# Patient Record
Sex: MALE | Race: NATIVE HAWAIIAN OR OTHER PACIFIC ISLANDER | NOT HISPANIC OR LATINO | Employment: OTHER | URBAN - METROPOLITAN AREA
[De-identification: names, ages, dates, MRNs, and addresses within clinical notes are randomized per-mention and may not be internally consistent; named-entity substitution may affect disease eponyms.]

---

## 2019-09-04 ENCOUNTER — TRANSFERRED RECORDS (OUTPATIENT)
Dept: HEALTH INFORMATION MANAGEMENT | Facility: CLINIC | Age: 69
End: 2019-09-04

## 2019-09-04 ENCOUNTER — MEDICAL CORRESPONDENCE (OUTPATIENT)
Dept: HEALTH INFORMATION MANAGEMENT | Facility: CLINIC | Age: 69
End: 2019-09-04

## 2019-09-12 NOTE — TELEPHONE ENCOUNTER
RECORDS RECEIVED FROM: External - Dr. Juan Bolton - [facility]   DATE RECEIVED: 10/29/19   NOTES (FOR ALL VISITS) STATUS DETAILS   OFFICE NOTE from referring provider In process 9/4/19   OFFICE NOTE from other specialist N/A    DISCHARGE SUMMARY from hospital N/A    DISCHARGE REPORT from the ER N/A    OPERATIVE REPORT N/A    MEDICATION LIST Received    IMAGING  (FOR ALL VISITS)     EMG N/A    EEG N/A    ECT N/A    MRI (HEAD, NECK, SPINE) N/A    LUMBAR PUNCTURE N/A    HE Scan N/A    CT (HEAD, NECK, SPINE) N/A       Action    Action Taken Only 9/4/19 office note received, will request additional notes via fax.

## 2019-09-26 ENCOUNTER — DOCUMENTATION ONLY (OUTPATIENT)
Dept: CARE COORDINATION | Facility: CLINIC | Age: 69
End: 2019-09-26

## 2019-10-29 ENCOUNTER — PRE VISIT (OUTPATIENT)
Dept: NEUROLOGY | Facility: CLINIC | Age: 69
End: 2019-10-29

## 2019-10-29 ENCOUNTER — OFFICE VISIT (OUTPATIENT)
Dept: NEUROLOGY | Facility: CLINIC | Age: 69
End: 2019-10-29
Payer: MEDICARE

## 2019-10-29 VITALS
WEIGHT: 185.5 LBS | SYSTOLIC BLOOD PRESSURE: 130 MMHG | OXYGEN SATURATION: 98 % | HEART RATE: 61 BPM | DIASTOLIC BLOOD PRESSURE: 60 MMHG

## 2019-10-29 DIAGNOSIS — G25.0 ESSENTIAL TREMOR: Primary | ICD-10-CM

## 2019-10-29 RX ORDER — AMPICILLIN TRIHYDRATE 250 MG
500 CAPSULE ORAL DAILY
COMMUNITY
Start: 2007-01-29 | End: 2020-05-14

## 2019-10-29 RX ORDER — CLOBETASOL PROPIONATE 0.05 MG/G
GEL TOPICAL
COMMUNITY
Start: 2019-06-14 | End: 2024-03-04

## 2019-10-29 RX ORDER — FLUOCINONIDE 0.5 MG/G
CREAM TOPICAL
COMMUNITY
Start: 2017-09-05 | End: 2020-05-14

## 2019-10-29 RX ORDER — NITROGLYCERIN 0.4 MG/1
0.4 TABLET SUBLINGUAL EVERY 5 MIN PRN
COMMUNITY
Start: 2018-04-10

## 2019-10-29 RX ORDER — CEPHALEXIN 500 MG/1
500 CAPSULE ORAL 2 TIMES DAILY
COMMUNITY
Start: 2019-09-20 | End: 2020-05-14

## 2019-10-29 RX ORDER — FUROSEMIDE 20 MG
20 TABLET ORAL DAILY PRN
COMMUNITY
Start: 2018-04-10

## 2019-10-29 RX ORDER — PRIMIDONE 50 MG/1
TABLET ORAL
Refills: 2 | COMMUNITY
Start: 2019-10-03 | End: 2021-02-09

## 2019-10-29 RX ORDER — ATORVASTATIN CALCIUM 80 MG/1
80 TABLET, FILM COATED ORAL DAILY
Refills: 0 | COMMUNITY
Start: 2019-08-07 | End: 2024-03-04

## 2019-10-29 RX ORDER — ASPIRIN 81 MG/1
81 TABLET ORAL DAILY
COMMUNITY
Start: 2008-04-03

## 2019-10-29 RX ORDER — SILDENAFIL CITRATE 20 MG/1
100 TABLET ORAL DAILY PRN
COMMUNITY
Start: 2019-05-07

## 2019-10-29 RX ORDER — DEXAMETHASONE 0.5 MG/5ML
1.5 SOLUTION ORAL 2 TIMES DAILY PRN
Refills: 6 | COMMUNITY
Start: 2019-10-15

## 2019-10-29 RX ORDER — NAPROXEN SODIUM 220 MG
1 TABLET ORAL 3 TIMES DAILY PRN
Status: ON HOLD | COMMUNITY
Start: 2007-06-11 | End: 2024-03-10

## 2019-10-29 RX ORDER — PROPRANOLOL HCL 60 MG
60 CAPSULE, EXTENDED RELEASE 24HR ORAL DAILY
COMMUNITY
Start: 2015-09-21 | End: 2020-05-14

## 2019-10-29 RX ORDER — TOPIRAMATE 25 MG/1
25 TABLET, FILM COATED ORAL 2 TIMES DAILY
Qty: 100 TABLET | Refills: 3 | Status: SHIPPED | OUTPATIENT
Start: 2019-10-29 | End: 2020-05-14

## 2019-10-29 RX ORDER — LORATADINE 10 MG/1
10 TABLET ORAL DAILY PRN
COMMUNITY
Start: 2016-08-12

## 2019-10-29 RX ORDER — LOSARTAN POTASSIUM 100 MG/1
100 TABLET ORAL DAILY
Refills: 3 | COMMUNITY
Start: 2019-08-18 | End: 2024-03-04

## 2019-10-29 RX ORDER — METHYLPREDNISOLONE 4 MG
1 TABLET, DOSE PACK ORAL DAILY
COMMUNITY
Start: 2004-04-05

## 2019-10-29 RX ORDER — INSULIN GLARGINE 100 [IU]/ML
30 INJECTION, SOLUTION SUBCUTANEOUS DAILY PRN
Refills: 0 | COMMUNITY
Start: 2019-10-14

## 2019-10-29 RX ORDER — FACIAL-BODY WIPES
1 EACH TOPICAL DAILY
COMMUNITY
Start: 2008-04-03

## 2019-10-29 SDOH — HEALTH STABILITY: MENTAL HEALTH: HOW OFTEN DO YOU HAVE A DRINK CONTAINING ALCOHOL?: MONTHLY OR LESS

## 2019-10-29 SDOH — HEALTH STABILITY: MENTAL HEALTH: HOW MANY STANDARD DRINKS CONTAINING ALCOHOL DO YOU HAVE ON A TYPICAL DAY?: 1 OR 2

## 2019-10-29 ASSESSMENT — PAIN SCALES - GENERAL: PAINLEVEL: NO PAIN (0)

## 2019-10-29 NOTE — LETTER
RE: Enrique Long  [address] [facility] [address]     Dear Colleague,    Thank you for referring your patient, Enrique Long, to the [facility] NEUROLOGY at [facility]. Please see a copy of my visit note below.    [facility]   DEPARTMENT OF NEUROLOGY  MOVEMENT DISORDERS CLINIC    Service Date: 10/29/2019        REFERRAL FOR:  We were asked to see Enrique Long by Dr. Bolton for evaluation of essential tremor versus focal dystonia.      HISTORY OF PRESENT ILLNESS:  Enrique Long is a very pleasant, 68-year-old gentleman with a past medical history significant for type 1 diabetes who presents in the Neurology office for evaluation of tremor.  The patient notes that he started having issues approximately 6-7 years ago primarily with writing using his right hand.  He notes that he was a  and started to have difficulty writing notes with his clients.  He said it was very mild at first, but he was able to write. Slowly, over time it became more and more illegible.  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  Unfortunately, this took away from the relationship between him and his clients, and he was forced into long term.  He notes that he was placed on primidone for this, which seemed to help somewhat, but has not taken away his difficulty with writing.  He has tried to quit taking primidone; however, his handwriting significantly worsens each time he had tried a taper.  He notes that over the last 2-3 years, he primarily has to use his left hand to hold down his right hand in order to even sign his name.  Off of the primidone, he is unable to use his left hand to hold down his right hand to sign his name.  Over the last year, he has started to have occasional trouble cutting meat with a knife and using a fork, balancing food and bringing it to his mouth.  He has tried an increased  dose of the primidone, but was unable to tolerate due to possible sexual dysfunction as well as drowsiness.  He is currently taking 1 tablet of primidone at night.  He does not notice any significant tremor in his left hand or the remainder of his body.  He has not noted any difficulty in walking or changes in his cognition.  He had tried propranolol in the past, but it caused a cough and therefore was stopped.  He denies any changes with alcohol or alcohol consumption.      REVIEW OF SYSTEMS:  Comprehensive review of systems was performed.      ALLERGIES:       Allergies   Allergen Reactions     Chocolate Flavor      Mouth pain     Codeine GI Disturbance and Other (See Comments)     PN: LW Reaction: gi upset       Lisinopril Cough     MEDICATIONS:    Current Outpatient Medications   Medication     aspirin 81 MG EC tablet     atorvastatin (LIPITOR) 80 MG tablet     cephALEXin (KEFLEX) 500 MG capsule     cinnamon 500 MG CAPS     clobetasol (TEMOVATE) 0.05 % GEL topical gel     dexamethasone AF (DECADRON) 0.1 MG/ML solution     Efinaconazole 10 % SOLN     fluocinonide (LIDEX) 0.05 % external cream     furosemide (LASIX) 20 MG tablet     glucagon (GLUCAGEN HYPOKIT) 1 MG SOLR injection     Glucosamine Sulfate 500 MG TABS     insulin aspart (NOVOLOG PEN) 100 UNIT/ML pen     insulin glargine (BASAGLAR KWIKPEN) 100 UNIT/ML pen     loratadine (CLARITIN) 10 MG tablet     losartan (COZAAR) 100 MG tablet     methylcellulose (CITRUCEL) 500 MG TABS tablet     naproxen sodium (ALEVE) 220 MG tablet     nitroGLYcerin (NITROSTAT) 0.4 MG sublingual tablet     Omega 3-6-9 Fatty Acids (RA OMEGA 3-6-9) CAPS     primidone (MYSOLINE) 50 MG tablet     propranolol ER (INDERAL LA) 60 MG 24 hr capsule     sildenafil (REVATIO) 20 MG tablet     topiramate (TOPAMAX) 25 MG tablet     UNABLE TO FIND     No current facility-administered medications for this visit.      PAST MEDICAL HISTORY:  Type 1 diabetes.      PAST SURGICAL HISTORY:  No  "pertinent surgical history.      SOCIAL HISTORY:  The patient is currently .  The patient was a  prior to skilled nursing.      FAMILY HISTORY:  The patient does have a significant family history of tremor.  He notes his \"dad's hands shook like crazy.\"  He  at age 66.  He believes his oldest sister also has a tremor, but it is not severe.      PHYSICAL EXAMINATION:   VITAL SIGNS:  Blood pressure 130/60, pulse 61.   GENERAL:  The patient is sitting in a chair in no acute distress.   HEENT:  Atraumatic.   CARDIAC:  Distal pulses are palpable.   PULMONARY:  Not in respiratory distress.   ABDOMEN:  Nondistended.   EXTREMITIES:  No abnormal swelling noted.   SKIN:  No abnormal bruising noted.   PSYCHIATRIC:  Appropriate affect.      NEUROLOGIC EXAMINATION:     MENTAL STATUS:  The patient is alert and oriented to person, place, time and situation.  His language is fluent with intact comprehension and naming.   CRANIAL NERVES:  Pupils are equal, round and reactive to light, extraocular motions are intact, full visual fields.  No facial asymmetry is noted.  Facial sensation is intact.  Tongue is midline.   MOTOR:  The patient has 5/5 strength throughout bilateral upper and lower extremities.   REFLEXES:  The patient has 2+ and symmetric reflex at the brachioradialis, biceps and patellae, 1+ at the Achilles.   SENSORY:  The patient has intact sensation to light touch and vibration throughout all 4 extremities.   COORDINATION:  The patient has an intention tremor noted primarily in the RUE. There is a subtle action tremor in the LUE.  The patient does have a significant tremor while copying figures as well as tremor while writing name.  This is worse on the right than the left, but present in both upper extremities.  The patient is unable to copy figures due to a severity of tremor. No noted dystonia during examination.  GAIT:  Normal width, stride length, turn and arm swing.          INVESTIGATIONS:  " We reviewed all relevant investigations during this office visit.      ASSESSMENT AND PLAN:  Mr. Lucio Long is a very pleasant, 68-year-old gentleman with a past medical history of type 1 diabetes mellitus who presents to the Neurology office for further evaluation of tremors versus dystonia.  On physical examination, the patient does have what appears to be an intention tremor in the RUE. There is a very subtle intention tremor in the LUE that is notable upon certain fine coordination tasks such as copying a figure.  The patient's most notable complaint is that he is having a difficult time writing.  It is very difficult to distinguish between a primary writing tremor and essential tremor in this patient. The bilateral nature indicates more likely to be essential tremor. However, it is very subtle in the LUE and very severe while writing which does make primary writing tremor a possibility. We extensively examined the patient for muscles that would be amenable to Botox injections. His motions and tremor are very exaggerated and therefore, we do not feel like Botox injections would be possible at this time. We would recommend further medication adjustment for essential tremor at this time. The patient was on propranolol in the past, but stopped this medication due to a cough.  The patient continues on primidone, but is unable to tolerate higher doses due to side effects.  Further options for treatment of this include Topamax and gabapentin.  We will attempt a trial of topiramate after the risks and benefits were discussed. He will start at 25mg BID and titrate up to benefit or until 100mg BID is reached.    #Essential tremor versus primary writing tremor  -Does not appear to be amenable to Botox injections  -Continue primidone  -Begin topamax 25mg BID and increase to adequate response or 100mg BID is reached.     The patient was seen and discussed with Dr. Won Gautam.      Carlos A Limon MD  Neurology  Resident, PGY-4    I, Won Gautam MD, personally interviewed, examined and evaluated this patient on 10/29/2019.  I discussed the patient with Dr. Carlos A Limon and agree with the assessment, examination and plan of care documented by Dr. Limon>.  I personally reviewed the vital signs, medications and labs/imaging.    This is a diagnostic dilemma.  We appreciate being asked to give a second opinion by Dr. Bolton.  This looks like primary writing tremor.  However, there is a family history of tremor, he has some tremor on the left side and he has had a response to Primidone.  All the latter points to assymetric essential tremor.  We will try topamax.  If this is not effective the only other thing to try would be borulinum toxin.  The tremor is not causing enough disability to consider DBS.    Dr. Won Gautam    D: 10/29/2019   T: 10/30/2019   MT: [email or web address]      Name:     WALTER RENTERIA   MRN:      [id number]        Account:      [id number]   :      1950           Service Date: 10/29/2019      Document: [id number]

## 2019-10-29 NOTE — PROGRESS NOTES
DEPARTMENT OF NEUROLOGY  Referral for: ***  Patient Name:  Enrique Long  MRN:  [id number]    :  1950  Date of Clinic Visit:  2019  Primary Care Provider:  No primary care provider on file.  Referring Provider: ***    CHIEF COMPLAINT: ***    HISTORY OF PRESENT ILLNESS:  Enrique Long is a 68 year old male presenting with ***.    Cant write his name, cant write anything.     Started 6-7 years ago, was having trouble controlling where his writing went. Mild at first and then became impossible. 2-3 years its been impossible to write. Primidone seemed to help somewhat. He has tried to quit primidone and it gets worse. Needs hand to hold it down with primidone. Off of it not even that helps. Currently taking primidone 1 tablet once at night.     Occasionally has trouble cutting meat with knife and fork. Holding a fork steady sometimes. Also t1dm so blood sugar plays a role in that. 1=2 a week other than writing. Doesn't recall these changing with primidone.     Retired eaerlier as  due to writing cramp in .     No tremor in the left hand.     Tried propranolol in the past but it caused a cough.     No change with alcohol.     ASSESSMENT AND PLAN:  ***    Plan:  - ***    Patient was seen and discussed with  ***.    Carlos A Limon MD  [facility] Department of Neurology  Pager: [phone]    PHYSICAL EXAMINATION:  Vitals: /60 (BP Location: Left arm, Patient Position: Sitting, Cuff Size: Adult Large)   Pulse 61   Wt 84.1 kg (185 lb 8 oz)   SpO2 98%   General:   HEENT:   Cardiac:   Pulmonary:   Abdomen:   Extremities:   Skin:   Psych:   Neuro:   Mental status: alert and oriented to person, place, and time; language is fluent with intact comprehension and naming   Cranial nerves: PERRL, EOMI, full visual fields, no facial asymmetry, facial sensation intact, tongue and uvula are midline   Motor: No abnormal posture, tone, atrophy, or  movements/fasciculations.    Biceps Triceps Deltoid Hip flexor Knee extension Knee flexion Ankle extension Ankle flexion   Right {Motor Rankin} {Motor Rankin} {Motor Rankin} {Motor Rankin} {Motor Rankin} {Motor Rankin} {Motor Rankin} {Motor Rankin} {Motor Rankin}   Left {Motor Rankin} {Motor Rankin} {Motor Rankin} {Motor Rankin} {Motor Rankin} {Motor Rankin} {Motor Rankin} {Motor Rankin} {Motor Rankin}    Reflexes: Absent Babinski. Absent Escobedo.   Brachioradialis Biceps Triceps Patellar Achilles   Right 2+ 2+ 2+ 2+ 2+   Left 2+ 2+ 2+ 2+ 2+    Sensory: Intact to light touch, pin prick, and vibration in all extremities. Romberg exam within normal limits.   Coordination: Intact FNF and heel-shin. No Dysmetria. No Dysdiadochokinesia.   Gait: Normal width, stride length, turn, and arm swing.    INVESTIGATIONS:   ***    REVIEW OF SYSTEMS: 12-point RoS negative except as per HPI.    ALLERGIES:  Allergies   Allergen Reactions     Chocolate Flavor      Mouth pain     Codeine GI Disturbance and Other (See Comments)     PN: LW Reaction: gi upset       Lisinopril Cough     MEDICATIONS:  Current Outpatient Medications   Medication Sig Dispense Refill     aspirin 81 MG EC tablet Take 81 mg by mouth daily       UNABLE TO FIND MEDICATION NAME: CBD: 1 tablet by mouth twice daily       atorvastatin (LIPITOR) 80 MG tablet Take 80 mg by mouth daily  0     PAST MEDICAL HISTORY:  No past medical history on file.  PAST SURGICAL HISTORY:  No past surgical history on file.  SOCIAL HISTORY:  Social History     Socioeconomic History     Marital status:      Spouse name: Not on file     Number of children: Not on file     Years of education: Not on file     Highest education level: Not on file   Occupational History     Not on file   Social Needs      Financial resource strain: Not on file     Food insecurity:     Worry: Not on file     Inability: Not on file     Transportation needs:     Medical: Not on file     Non-medical: Not on file   Tobacco Use     Smoking status: Not on file   Substance and Sexual Activity     Alcohol use: Not on file     Drug use: Not on file     Sexual activity: Not on file   Lifestyle     Physical activity:     Days per week: Not on file     Minutes per session: Not on file     Stress: Not on file   Relationships     Social connections:     Talks on phone: Not on file     Gets together: Not on file     Attends Faith service: Not on file     Active member of club or organization: Not on file     Attends meetings of clubs or organizations: Not on file     Relationship status: Not on file     Intimate partner violence:     Fear of current or ex partner: Not on file     Emotionally abused: Not on file     Physically abused: Not on file     Forced sexual activity: Not on file   Other Topics Concern     Not on file   Social History Narrative     Not on file     FAMILY HISTORY:  No family history on file.   Dads hands shook like crazy,  at 66.  7 siblings. Oldest sister is 88 and possibly has tremor.

## 2019-10-30 ENCOUNTER — HEALTH MAINTENANCE LETTER (OUTPATIENT)
Age: 69
End: 2019-10-30

## 2019-10-30 NOTE — PROGRESS NOTES
[facility]   DEPARTMENT OF NEUROLOGY  MOVEMENT DISORDERS CLINIC    Service Date: 10/29/2019        REFERRAL FOR:  We were asked to see Enrique Long by Dr. Bolton for evaluation of essential tremor versus focal dystonia.      HISTORY OF PRESENT ILLNESS:  Enrique Long is a very pleasant, 68-year-old gentleman with a past medical history significant for type 1 diabetes who presents in the Neurology office for evaluation of tremor.  The patient notes that he started having issues approximately 6-7 years ago primarily with writing using his right hand.  He notes that he was a  and started to have difficulty writing notes with his clients.  He said it was very mild at first, but he was able to write. Slowly, over time it became more and more illegible.  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  Unfortunately, this took away from the relationship between him and his clients, and he was forced into residential.  He notes that he was placed on primidone for this, which seemed to help somewhat, but has not taken away his difficulty with writing.  He has tried to quit taking primidone; however, his handwriting significantly worsens each time he had tried a taper.  He notes that over the last 2-3 years, he primarily has to use his left hand to hold down his right hand in order to even sign his name.  Off of the primidone, he is unable to use his left hand to hold down his right hand to sign his name.  Over the last year, he has started to have occasional trouble cutting meat with a knife and using a fork, balancing food and bringing it to his mouth.  He has tried an increased dose of the primidone, but was unable to tolerate due to possible sexual dysfunction as well as drowsiness.  He is currently taking 1 tablet of primidone at night.  He does not notice any significant tremor in his left hand or the remainder of his body.  He has not noted any  "difficulty in walking or changes in his cognition.  He had tried propranolol in the past, but it caused a cough and therefore was stopped.  He denies any changes with alcohol or alcohol consumption.      REVIEW OF SYSTEMS:  Comprehensive review of systems was performed.      ALLERGIES:       Allergies   Allergen Reactions     Chocolate Flavor      Mouth pain     Codeine GI Disturbance and Other (See Comments)     PN: LW Reaction: gi upset       Lisinopril Cough     MEDICATIONS:    Current Outpatient Medications   Medication     aspirin 81 MG EC tablet     atorvastatin (LIPITOR) 80 MG tablet     cephALEXin (KEFLEX) 500 MG capsule     cinnamon 500 MG CAPS     clobetasol (TEMOVATE) 0.05 % GEL topical gel     dexamethasone AF (DECADRON) 0.1 MG/ML solution     Efinaconazole 10 % SOLN     fluocinonide (LIDEX) 0.05 % external cream     furosemide (LASIX) 20 MG tablet     glucagon (GLUCAGEN HYPOKIT) 1 MG SOLR injection     Glucosamine Sulfate 500 MG TABS     insulin aspart (NOVOLOG PEN) 100 UNIT/ML pen     insulin glargine (BASAGLAR KWIKPEN) 100 UNIT/ML pen     loratadine (CLARITIN) 10 MG tablet     losartan (COZAAR) 100 MG tablet     methylcellulose (CITRUCEL) 500 MG TABS tablet     naproxen sodium (ALEVE) 220 MG tablet     nitroGLYcerin (NITROSTAT) 0.4 MG sublingual tablet     Omega 3-6-9 Fatty Acids (RA OMEGA 3-6-9) CAPS     primidone (MYSOLINE) 50 MG tablet     propranolol ER (INDERAL LA) 60 MG 24 hr capsule     sildenafil (REVATIO) 20 MG tablet     topiramate (TOPAMAX) 25 MG tablet     UNABLE TO FIND     No current facility-administered medications for this visit.      PAST MEDICAL HISTORY:  Type 1 diabetes.      PAST SURGICAL HISTORY:  No pertinent surgical history.      SOCIAL HISTORY:  The patient is currently .  The patient was a  prior to nursing home.      FAMILY HISTORY:  The patient does have a significant family history of tremor.  He notes his \"dad's hands shook like crazy.\"  He  at " age 66.  He believes his oldest sister also has a tremor, but it is not severe.      PHYSICAL EXAMINATION:   VITAL SIGNS:  Blood pressure 130/60, pulse 61.   GENERAL:  The patient is sitting in a chair in no acute distress.   HEENT:  Atraumatic.   CARDIAC:  Distal pulses are palpable.   PULMONARY:  Not in respiratory distress.   ABDOMEN:  Nondistended.   EXTREMITIES:  No abnormal swelling noted.   SKIN:  No abnormal bruising noted.   PSYCHIATRIC:  Appropriate affect.      NEUROLOGIC EXAMINATION:     MENTAL STATUS:  The patient is alert and oriented to person, place, time and situation.  His language is fluent with intact comprehension and naming.   CRANIAL NERVES:  Pupils are equal, round and reactive to light, extraocular motions are intact, full visual fields.  No facial asymmetry is noted.  Facial sensation is intact.  Tongue is midline.   MOTOR:  The patient has 5/5 strength throughout bilateral upper and lower extremities.   REFLEXES:  The patient has 2+ and symmetric reflex at the brachioradialis, biceps and patellae, 1+ at the Achilles.   SENSORY:  The patient has intact sensation to light touch and vibration throughout all 4 extremities.   COORDINATION:  The patient has an intention tremor noted primarily in the RUE. There is a subtle action tremor in the LUE.  The patient does have a significant tremor while copying figures as well as tremor while writing name.  This is worse on the right than the left, but present in both upper extremities.  The patient is unable to copy figures due to a severity of tremor. No noted dystonia during examination.  GAIT:  Normal width, stride length, turn and arm swing.          INVESTIGATIONS:  We reviewed all relevant investigations during this office visit.      ASSESSMENT AND PLAN:  Mr. Lucio Long is a very pleasant, 68-year-old gentleman with a past medical history of type 1 diabetes mellitus who presents to the Neurology office for further evaluation of tremors versus  dystonia.  On physical examination, the patient does have what appears to be an intention tremor in the RUE. There is a very subtle intention tremor in the LUE that is notable upon certain fine coordination tasks such as copying a figure.  The patient's most notable complaint is that he is having a difficult time writing.  It is very difficult to distinguish between a primary writing tremor and essential tremor in this patient. The bilateral nature indicates more likely to be essential tremor. However, it is very subtle in the LUE and very severe while writing which does make primary writing tremor a possibility. We extensively examined the patient for muscles that would be amenable to Botox injections. His motions and tremor are very exaggerated and therefore, we do not feel like Botox injections would be possible at this time. We would recommend further medication adjustment for essential tremor at this time. The patient was on propranolol in the past, but stopped this medication due to a cough.  The patient continues on primidone, but is unable to tolerate higher doses due to side effects.  Further options for treatment of this include Topamax and gabapentin.  We will attempt a trial of topiramate after the risks and benefits were discussed. He will start at 25mg BID and titrate up to benefit or until 100mg BID is reached.    #Essential tremor versus primary writing tremor  -Does not appear to be amenable to Botox injections  -Continue primidone  -Begin topamax 25mg BID and increase to adequate response or 100mg BID is reached.     The patient was seen and discussed with Dr. Won Gautam.      Carlos A Limon MD  Neurology Resident, PGY-4    I, Won Gautam MD, personally interviewed, examined and evaluated this patient on 10/29/2019.  I discussed the patient with Dr. Carlos A Limon and agree with the assessment, examination and plan of care documented by Dr. Limon>.  I personally reviewed the vital  signs, medications and labs/imaging.    This is a diagnostic dilemma.  We appreciate being asked to give a second opinion by Dr. Bolton.  This looks like primary writing tremor.  However, there is a family history of tremor, he has some tremor on the left side and he has had a response to Primidone.  All the latter points to assymetric essential tremor.  We will try topamax.  If this is not effective the only other thing to try would be borulinum toxin.  The tremor is not causing enough disability to consider DBS.    Dr. Won Gautam    D: 10/29/2019   T: 10/30/2019   MT: [email or web address]      Name:     WALTER RENTERIA   MRN:      [id number]        Account:      [id number]   :      1950           Service Date: 10/29/2019      Document: [id number]

## 2019-12-15 ENCOUNTER — HEALTH MAINTENANCE LETTER (OUTPATIENT)
Age: 69
End: 2019-12-15

## 2020-02-04 ENCOUNTER — OFFICE VISIT (OUTPATIENT)
Dept: NEUROLOGY | Facility: CLINIC | Age: 70
End: 2020-02-04
Payer: MEDICARE

## 2020-02-04 VITALS
WEIGHT: 187.4 LBS | SYSTOLIC BLOOD PRESSURE: 153 MMHG | OXYGEN SATURATION: 99 % | DIASTOLIC BLOOD PRESSURE: 73 MMHG | HEART RATE: 61 BPM

## 2020-02-04 DIAGNOSIS — R25.1 TREMOR: Primary | ICD-10-CM

## 2020-02-04 ASSESSMENT — ENCOUNTER SYMPTOMS
FLANK PAIN: 0
HEMATURIA: 0
SORE THROAT: 0
HOARSE VOICE: 0
NECK MASS: 0
TASTE DISTURBANCE: 0
SINUS PAIN: 0
SMELL DISTURBANCE: 0
DIFFICULTY URINATING: 0
TROUBLE SWALLOWING: 0
DYSURIA: 0
SINUS CONGESTION: 1

## 2020-02-04 ASSESSMENT — PAIN SCALES - GENERAL: PAINLEVEL: NO PAIN (0)

## 2020-02-04 NOTE — NURSING NOTE
Chief Complaint   Patient presents with     RECHECK     [facility] RETURN - 3 MONTH FOLLOW UP       Ernie John, EMT

## 2020-02-04 NOTE — PATIENT INSTRUCTIONS
1.  You can taper off the topiramate by one a tablet a day this week then stop  2.  I will schedule a tremor study to see if botulinum toxin injections would be helpful

## 2020-02-04 NOTE — PROGRESS NOTES
Movement Disorder Clinic follow up note    Patient: Enrique Long  Medical Record Number: [id number]  Encounter Date: February 4, 2020  PCP:No primary care provider on file.    CC: Tremor    Impression:  1.  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  There is been no real and improvement topiramate.  We could try increasing the dose but he would likely have side effects.  At this point we will taper him off by having him stop 125 mg tablet this week and stopping the medication next week.  2.  He should stay on primidone 50 mg at bedtime.  3.  We talked about botulinum toxin injections.  As I watch his tremor much of it is in the proximal arm muscles.  These would not be amenable to Botox therapy.  We are going to do a tremor study and see if we could map where the muscles are.  If it is more forearm muscles causing the problem we could inject those.  4.  We again spoke about deep brain stimulation but he was not interested in pursuing at this time which is understandable.    Return to clinic: PRN    Interval Hx:: Mr. Enrique Long returns to clinic today for follow-up of his right arm tremor.  This had some features of essential tremor and others of primary writing tremor.  It is longtime unilateral nature and task specific nature made his think it was a primary writing tremor.  We tried topiramate but he feels it is not of any benefit.  He was supposed escalated from 25 mg twice a day but remains just on that dosage.  He does not feel it is helping at all.  He is not having side effects such as weight loss or loss of appetite.    He continues to have severe tremor of the right hand when he writes.  He can do most other things with his right hand.  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  I asked if there were things that he would be able to do if he had no tremor and he could not really think of any.  He does not feel disabled with his tremor although he would like to be able to sign checks  other documents.    We talked about botulinum toxin therapy and he would try this.  We talked about deep brain stimulation in both he and I thought this would be an extreme measure for his degree of disability.    Current medications    Movement Disorder-related Medications                    bedtime       Primidone 50 mg                                              1                                                                                   Current Outpatient Medications   Medication     aspirin 81 MG EC tablet     atorvastatin (LIPITOR) 80 MG tablet     cinnamon 500 MG CAPS     clobetasol (TEMOVATE) 0.05 % GEL topical gel     dexamethasone AF (DECADRON) 0.1 MG/ML solution     fluocinonide (LIDEX) 0.05 % external cream     furosemide (LASIX) 20 MG tablet     glucagon (GLUCAGEN HYPOKIT) 1 MG SOLR injection     Glucosamine Sulfate 500 MG TABS     insulin aspart (NOVOLOG PEN) 100 UNIT/ML pen     insulin glargine (BASAGLAR KWIKPEN) 100 UNIT/ML pen     loratadine (CLARITIN) 10 MG tablet     losartan (COZAAR) 100 MG tablet     methylcellulose (CITRUCEL) 500 MG TABS tablet     naproxen sodium (ALEVE) 220 MG tablet     nitroGLYcerin (NITROSTAT) 0.4 MG sublingual tablet     Omega 3-6-9 Fatty Acids (RA OMEGA 3-6-9) CAPS     primidone (MYSOLINE) 50 MG tablet     propranolol ER (INDERAL LA) 60 MG 24 hr capsule     sildenafil (REVATIO) 20 MG tablet     topiramate (TOPAMAX) 25 MG tablet     UNABLE TO FIND     cephALEXin (KEFLEX) 500 MG capsule     Efinaconazole 10 % SOLN     No current facility-administered medications for this visit.        Examination:  BP (!) 153/73 (BP Location: Left arm, Patient Position: Sitting, Cuff Size: Adult Large)   Pulse 61   Wt 85 kg (187 lb 6.4 oz)   SpO2 99%   General- no distress, no rash, good pulses, no edema  Respiratory-auscultation over the anterior lung fields is clear  Cardiac- regular rate and rhythm    Neurologic  Mental status  Patient is alert, appropriate, speech is fluent  and comprehension is intact    Cranial nerve testing  Pupils are equal and reactive to light, visual fields are full to confrontation bilaterally  Extraocular movements are full  Facial sensation is intact, face is symmetric with rest and activation  Palate rises symmetrically, tongue protrudes at midline with normal movements  Sterocleidomastoid and trapezius strength is normal and symmetric    Motor  Bulk and tone are normal  Strength is 5/5 shoulder abduction, finger abduction, arm flexion and extension, hip flexion, plantar and dorsiflexion    Sensation  Intact to pin, vibration   Proprioception intact in great toes and fingers    Tendon reflexes  Testing at brachioradialis, biceps, triceps, patella and achilles bilaterally showed normal reflexes, symmetrically, without Babinski or Escobedo signs    Coordination  Finger nose finger testing: normalRapid alternating movements are normal.  Gait and Station: normal  Motor (Performance) Sub-Scale 2/4/2020   Assessment Time 8:02 AM   Medication On   DBS - Right Brain None   DBS - Left Brain None   Head 0   Face & Jaw 0   Voice 0   Outstretched - RIGHT 0   Outstretched - LEFT 0   Wingbeating - RIGHT 0   Wingbeating - LEFT 0   Kinetic - RIGHT 3   Kinetic - LEFT 0   Lower Limb - RIGHT 0   Lower Limb - LEFT 0   Lower Limb (Max) 0   Spiral - RIGHT 2   Spiral - LEFT 0   Handwriting 2   Dot approx - RIGHT 0   Dot approx - LEFT 0   Trunk (Standing) 0   Total Right 5   Total Left 0   Axial 0   TOTAL 7     25 minutes of total time was spent face-to-face with the patient over 50% of which was counseling..    Answers for HPI/ROS submitted by the patient on 2/4/2020   General Symptoms: No  Skin Symptoms: No  HENT Symptoms: Yes  EYE SYMPTOMS: No  HEART SYMPTOMS: No  LUNG SYMPTOMS: No  INTESTINAL SYMPTOMS: No  URINARY SYMPTOMS: Yes  REPRODUCTIVE SYMPTOMS: Yes  SKELETAL SYMPTOMS: No  BLOOD SYMPTOMS: No  NERVOUS SYSTEM SYMPTOMS: No  MENTAL HEALTH SYMPTOMS: No  Ear pain: No  Ear  discharge: No  Hearing loss: Yes  Tinnitus: Yes  Nosebleeds: No  Congestion: Yes  Sinus pain: No  Trouble swallowing: No   Voice hoarseness: No  Mouth sores: Yes  Sore throat: No  Tooth pain: No  Gum tenderness: No  Bleeding gums: No  Change in taste: No  Change in sense of smell: No  Dry mouth: No  Hearing aid used: No  Neck lump: No  Trouble holding urine or incontinence: Yes  Pain or burning: No  Trouble starting or stopping: No  Increased frequency of urination: Yes  Blood in urine: No  Decreased frequency of urination: No  Frequent nighttime urination: No  Flank pain: No  Difficulty emptying bladder: No  Scrotal pain or swelling: No  Erectile dysfunction: No  Penile discharge: No  Genital ulcers: No  Reduced libido: Yes

## 2020-02-04 NOTE — LETTER
2/4/2020       RE: Enrique Long  [address] [facility] [address]     Dear Colleague,    Thank you for referring your patient, Enrique Long, to the [facility] NEUROLOGY at [facility]. Please see a copy of my visit note below.    Movement Disorder Clinic follow up note    Patient: Enrique Long  Medical Record Number: [id number]  Encounter Date: February 4, 2020  PCP:No primary care provider on file.    CC: Tremor    Impression:  1.  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  There is been no real and improvement topiramate.  We could try increasing the dose but he would likely have side effects.  At this point we will taper him off by having him stop 125 mg tablet this week and stopping the medication next week.  2.  He should stay on primidone 50 mg at bedtime.  3.  We talked about botulinum toxin injections.  As I watch his tremor much of it is in the proximal arm muscles.  These would not be amenable to Botox therapy.  We are going to do a tremor study and see if we could map where the muscles are.  If it is more forearm muscles causing the problem we could inject those.  4.  We again spoke about deep brain stimulation but he was not interested in pursuing at this time which is understandable.    Return to clinic: PRN    Interval Hx:: Mr. Enrique Long returns to clinic today for follow-up of his right arm tremor.  This had some features of essential tremor and others of primary writing tremor.  It is longtime unilateral nature and task specific nature made his think it was a primary writing tremor.  We tried topiramate but he feels it is not of any benefit.  He was supposed escalated from 25 mg twice a day but remains just on that dosage.  He does not feel it is helping at all.  He is not having side effects such as weight loss or loss of appetite.    He continues to have severe tremor of the right hand when he writes.  He can do most  other things with his right hand.  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  I asked if there were things that he would be able to do if he had no tremor and he could not really think of any.  He does not feel disabled with his tremor although he would like to be able to sign checks other documents.    We talked about botulinum toxin therapy and he would try this.  We talked about deep brain stimulation in both he and I thought this would be an extreme measure for his degree of disability.    Current medications    Movement Disorder-related Medications                    bedtime       Primidone 50 mg                                              1                                                                                   Current Outpatient Medications   Medication     aspirin 81 MG EC tablet     atorvastatin (LIPITOR) 80 MG tablet     cinnamon 500 MG CAPS     clobetasol (TEMOVATE) 0.05 % GEL topical gel     dexamethasone AF (DECADRON) 0.1 MG/ML solution     fluocinonide (LIDEX) 0.05 % external cream     furosemide (LASIX) 20 MG tablet     glucagon (GLUCAGEN HYPOKIT) 1 MG SOLR injection     Glucosamine Sulfate 500 MG TABS     insulin aspart (NOVOLOG PEN) 100 UNIT/ML pen     insulin glargine (BASAGLAR KWIKPEN) 100 UNIT/ML pen     loratadine (CLARITIN) 10 MG tablet     losartan (COZAAR) 100 MG tablet     methylcellulose (CITRUCEL) 500 MG TABS tablet     naproxen sodium (ALEVE) 220 MG tablet     nitroGLYcerin (NITROSTAT) 0.4 MG sublingual tablet     Omega 3-6-9 Fatty Acids (RA OMEGA 3-6-9) CAPS     primidone (MYSOLINE) 50 MG tablet     propranolol ER (INDERAL LA) 60 MG 24 hr capsule     sildenafil (REVATIO) 20 MG tablet     topiramate (TOPAMAX) 25 MG tablet     UNABLE TO FIND     cephALEXin (KEFLEX) 500 MG capsule     Efinaconazole 10 % SOLN     No current facility-administered medications for this visit.        Examination:  BP (!) 153/73 (BP Location: Left arm, Patient Position: Sitting, Cuff Size: Adult  Large)   Pulse 61   Wt 85 kg (187 lb 6.4 oz)   SpO2 99%   General- no distress, no rash, good pulses, no edema  Respiratory-auscultation over the anterior lung fields is clear  Cardiac- regular rate and rhythm    Neurologic  Mental status  Patient is alert, appropriate, speech is fluent and comprehension is intact    Cranial nerve testing  Pupils are equal and reactive to light, visual fields are full to confrontation bilaterally  Extraocular movements are full  Facial sensation is intact, face is symmetric with rest and activation  Palate rises symmetrically, tongue protrudes at midline with normal movements  Sterocleidomastoid and trapezius strength is normal and symmetric    Motor  Bulk and tone are normal  Strength is 5/5 shoulder abduction, finger abduction, arm flexion and extension, hip flexion, plantar and dorsiflexion    Sensation  Intact to pin, vibration   Proprioception intact in great toes and fingers    Tendon reflexes  Testing at brachioradialis, biceps, triceps, patella and achilles bilaterally showed normal reflexes, symmetrically, without Babinski or Escobedo signs    Coordination  Finger nose finger testing: normalRapid alternating movements are normal.  Gait and Station: normal  Motor (Performance) Sub-Scale 2/4/2020   Assessment Time 8:02 AM   Medication On   DBS - Right Brain None   DBS - Left Brain None   Head 0   Face & Jaw 0   Voice 0   Outstretched - RIGHT 0   Outstretched - LEFT 0   Wingbeating - RIGHT 0   Wingbeating - LEFT 0   Kinetic - RIGHT 3   Kinetic - LEFT 0   Lower Limb - RIGHT 0   Lower Limb - LEFT 0   Lower Limb (Max) 0   Spiral - RIGHT 2   Spiral - LEFT 0   Handwriting 2   Dot approx - RIGHT 0   Dot approx - LEFT 0   Trunk (Standing) 0   Total Right 5   Total Left 0   Axial 0   TOTAL 7     25 minutes of total time was spent face-to-face with the patient over 50% of which was counseling..    Again, thank you for allowing me to participate in the care of your patient.       Sincerely,    Won Gautam MD

## 2020-03-12 ENCOUNTER — OFFICE VISIT (OUTPATIENT)
Dept: NEUROLOGY | Facility: CLINIC | Age: 70
End: 2020-03-12
Payer: MEDICARE

## 2020-03-12 DIAGNOSIS — G24.9 DYSTONIA: ICD-10-CM

## 2020-03-12 DIAGNOSIS — R25.1 TREMOR: ICD-10-CM

## 2020-03-12 DIAGNOSIS — R25.1 TREMOR: Primary | ICD-10-CM

## 2020-03-12 NOTE — PROGRESS NOTES
[facility]  Electrodiagnostic Laboratory    Nerve Conduction & EMG Report          Patient:       Enrique Long  Patient ID:    [id number]  Gender:        Male  YOB: 1950  Age:           69 Years 3 Months        History & Examination: This is a 69-year-old male who I have been seeing for primary writing tremor.  We are doing an EMG mapping study today to assess whether he would be a candidate for botulinum toxin therapy.      Techniques: Surface EMG studies were done with surface electrodes placed 2 to 4 cm apart on the long axis of muscles.  The right first dorsal interosseous, right extensor indices proprius, right extensor digitorum commonness, right flexor carpi ulnaris, right flexor digitorum area, right biceps, right triceps and right deltoid.       Results: The patient was asked to write.  When he did so writing was interrupted by brief tremor bursts at about 4 to 5 Hz.  These were maximal in the first dorsal interosseous and extensor indices proprius.  There were also prominent bursa in forearm muscles.  The first were in both the extensor and flexor groups of the forearms.  There was no bursting in proximal muscles (biceps triceps and deltoid).  The bursting could indicate tremor or phasic dystonia.      Interpretation: The surface EMG is abnormal.  The patient's handwriting is interrupted by rhythmic bursts consistent with primary writing tremor or phasic dystonia.        EMG Physician: Won Gautam MD

## 2020-03-12 NOTE — LETTER
3/12/2020       RE: Enrique Long  [address] [facility] [address]     Dear Colleague,    Thank you for referring your patient, Enrique Long, to the [facility] EMG at [facility]. Please see a copy of my visit note below.        [facility]  Electrodiagnostic Laboratory    Nerve Conduction & EMG Report          Patient:       Enrique Long  Patient ID:    [id number]  Gender:        Male  YOB: 1950  Age:           69 Years 3 Months        History & Examination: This is a 69-year-old male who I have been seeing for primary writing tremor.  We are doing an EMG mapping study today to assess whether he would be a candidate for botulinum toxin therapy.      Techniques: Surface EMG studies were done with surface electrodes placed 2 to 4 cm apart on the long axis of muscles.  The right first dorsal interosseous, right extensor indices proprius, right extensor digitorum commonness, right flexor carpi ulnaris, right flexor digitorum area, right biceps, right triceps and right deltoid.       Results: The patient was asked to write.  When he did so writing was interrupted by brief tremor bursts at about 4 to 5 Hz.  These were maximal in the first dorsal interosseous and extensor indices proprius.  There were also prominent bursa in forearm muscles.  The first were in both the extensor and flexor groups of the forearms.  There was no bursting in proximal muscles (biceps triceps and deltoid).  The bursting could indicate tremor or phasic dystonia.      Interpretation: The surface EMG is abnormal.  The patient's handwriting is interrupted by rhythmic bursts consistent with primary writing tremor or phasic dystonia.        EMG Physician: Won Gautam MD             
negative...

## 2020-03-16 ENCOUNTER — TELEPHONE (OUTPATIENT)
Dept: NEUROLOGY | Facility: CLINIC | Age: 70
End: 2020-03-16

## 2020-03-16 NOTE — TELEPHONE ENCOUNTER
botulinum toxin type A (BOTOX) 100 units injection 100 Units  100 Units  SEE ADMIN INSTRUCTIONS  3/12/2020   --    Admin Instructions: 400 units per year given as 100 units every 3 months      Prior Authorization Infusion/Clinic Administered Request    Location: [facility] NEUROLOGY  [address] [place] [address]  Phone: [phone]  Fax: [phone]    Diagnosis and ICD: Dystonia G24.9  Drug/Therapy: See above    Previously Tried and Failed Therapies: Primidone 50 mg    Date of provider note with supporting information: 2/4/2020, 3/12/2020    Urgency (When is the patient scheduled?): 02.9 Other Brain    Would you like to include any research articles? No        If yes please call [phone] for further instructions about sending that information

## 2020-03-18 NOTE — TELEPHONE ENCOUNTER
Due to the recent developments of COVID-19 we are having to schedule patient appointments about 4-6 weeks out.    Some people notice a temporary weakness of muscles or mild discomfort at the injection site. Bleeding at an injection site or within individual muscles can occur. Injections into limb muscles can result in excessive weakness of adjacent muscles of the arm including weakness with grasping objects. Fortunately, if managed appropriately, side effects of botulinum toxin are temporary and usually resolve in a few days or weeks. Dr. Gautam always starts low dosing to see how you react. It may take a few appointments to get the treat to work for your specific needs.    Avoid getting any shots or vaccines 2 weeks before or 2 weeks after your Botox injections.    Lucio would like to schedule for Wednesday 5/6 at 3 PM.

## 2020-05-14 ENCOUNTER — OFFICE VISIT (OUTPATIENT)
Dept: NEUROLOGY | Facility: CLINIC | Age: 70
End: 2020-05-14
Payer: MEDICARE

## 2020-05-14 VITALS — TEMPERATURE: 98.6 F

## 2020-05-14 DIAGNOSIS — G25.2 PRIMARY WRITING TREMOR: Primary | ICD-10-CM

## 2020-05-14 DIAGNOSIS — G24.8 FOCAL DYSTONIA: ICD-10-CM

## 2020-05-14 NOTE — PROGRESS NOTES
Movement Disorders Botulinum Toxin Clinic Note     Chief Complaint: primary writing tremor    History of Present Illness:  Enrique Long is a 69 year old male who presents to clinic for botulinum toxin injections for treatment of primary writing tremor, a form of focal dystonia.  This is first set of botox injections.     Diagnosed by Dr. Gautam with surface EMG mapping performed 3/12/20, which will guide today's injection pattern.      Current Outpatient Medications   Medication Sig Dispense Refill     aspirin 81 MG EC tablet Take 81 mg by mouth daily       atorvastatin (LIPITOR) 80 MG tablet Take 80 mg by mouth daily  0     clobetasol (TEMOVATE) 0.05 % GEL topical gel Apply  topically to affected area(s) 2 times daily.       dexamethasone AF (DECADRON) 0.1 MG/ML solution RINSE &SPIT 15ML FOR 2-3MIN, 2XDAILY X 4WK. DON'T SWALLOW. RINSE MOUTH WITH SALTWATER 30-40MIN AFTER  6     Efinaconazole 10 % SOLN as needed       furosemide (LASIX) 20 MG tablet Take 20 mg by mouth daily as needed       glucagon (GLUCAGEN HYPOKIT) 1 MG SOLR injection Inject 1 mg subcutaneously as needed. LW Addl Instr:Indicated for: Hypoglycemia       Glucosamine Sulfate 500 MG TABS Take 1 tablet by mouth daily       HEMP OIL OR EXTRACT OR OTHER CBD CANNABINOID, NOT MEDICAL CANNABIS,        insulin aspart (NOVOLOG PEN) 100 UNIT/ML pen INJECT 10 TO 15 UNITS SUBCUTANEOUSLY PREMEAL 3 TIMES DAILY AS DIRECTED       insulin glargine (BASAGLAR KWIKPEN) 100 UNIT/ML pen INJECT 30 UNITS SUBCUTANEOUSLY IN THE MORNING  0     loratadine (CLARITIN) 10 MG tablet Take 10 mg by mouth daily as needed       losartan (COZAAR) 100 MG tablet Take 100 mg by mouth daily  3     methylcellulose (CITRUCEL) 500 MG TABS tablet Take 1 capsule by mouth 2 times daily as needed       naproxen sodium (ALEVE) 220 MG tablet Take 2 tablets by mouth 2 times daily as needed        nitroGLYcerin (NITROSTAT) 0.4 MG sublingual tablet Place 0.4 mg under the tongue every 5 minutes as  needed       Omega 3-6-9 Fatty Acids (RA OMEGA 3-6-9) CAPS Take 1 capsule by mouth daily       primidone (MYSOLINE) 50 MG tablet TAKE 2 TABLETS BY MOUTH TWICE DAILY, SEE SCHEDULE FROM CLINIC  2     sildenafil (REVATIO) 20 MG tablet USE 5 TABLETS ONCE DAILY AS NEEDED FOR ERECTILE DYSFUNCTION         Allergies: He is allergic to chocolate flavor; codeine; and lisinopril.    No past medical history on file.    No past surgical history on file.    Social History     Socioeconomic History     Marital status:      Spouse name: Not on file     Number of children: Not on file     Years of education: Not on file     Highest education level: Not on file   Occupational History     Not on file   Social Needs     Financial resource strain: Not on file     Food insecurity     Worry: Not on file     Inability: Not on file     Transportation needs     Medical: Not on file     Non-medical: Not on file   Tobacco Use     Smoking status: Never Smoker     Smokeless tobacco: Never Used   Substance and Sexual Activity     Alcohol use: Yes     Frequency: Monthly or less     Drinks per session: 1 or 2     Drug use: Never     Sexual activity: Not on file   Lifestyle     Physical activity     Days per week: Not on file     Minutes per session: Not on file     Stress: Not on file   Relationships     Social connections     Talks on phone: Not on file     Gets together: Not on file     Attends [employer] service: Not on file     Active member of club or organization: Not on file     Attends meetings of clubs or organizations: Not on file     Relationship status: Not on file     Intimate partner violence     Fear of current or ex partner: Not on file     Emotionally abused: Not on file     Physically abused: Not on file     Forced sexual activity: Not on file   Other Topics Concern     Not on file   Social History Narrative     Not on file       No family history on file.    Physical Examination:  Vital Signs:   temperature is 98.6  F (37   C).   He is alert and oriented and has fluent speech without dysarthria and is able to provide an interval medical history  No rest tremor.   With writing he had marked 4-6 Hz tremor that makes his handwriting illegible.    BOTULINUM NEUROTOXIN INJECTION PROCEDURES:    VERIFICATION OF PATIENT IDENTIFICATION AND PROCEDURE     Initials   Patient Name LES   Patient  LES   Procedure Verified by: LES         Sedation (Moderate or Deep): None      Above assessments performed by:  Kelsi Hernandez MD      INDICATION/S FOR PROCEDURE/S:  Enrique Long is a 69 year old year old patient with dystonia affecting the  right upper extremity secondary to a diagnosis of primary writing tremor with associated  tremor and loss of volitional motor control.     His baseline symptoms have been recalcitrant to oral medications and conservative therapy.  He is here today for an injection of Botox.      GOAL OF PROCEDURE:  The goal of this procedure is to improve volitional motor control associated with dystonic movements.    TOTAL DOSE ADMINISTERED:  Dose Administered:  54.5 units Botox    Diluent Used:  Preservative Free Normal Saline  Total Volume of Diluent Used:  1 ml  Lot # [id number] with Expiration Date:  10/2022  NDC #: Botox 100u ([address]-78)      CONSENT:  The risks, benefits, and treatment options were discussed with Enrique Long and she agreed to proceed.      Written consent was obtained by LES.     EQUIPMENT USED:  Needle-37mm stimulating/recording    SKIN PREPARATION:  Skin preparation was performed using an alcohol wipe.    GUIDANCE DESCRIPTION:  Electro-myographic guidance was necessary throughout the procedure to accurately identify all areas of dystonic muscles while avoiding injection of non-dystonic muscles, neighboring nerves and nearby vascular structures.     AREA/MUSCLE INJECTED:          Muscles Injected Units Injected Number of Injections   Right FDIH 2.5 1   Right extensor indicis proprius 7 1   Right  extensor digitorum communis 20 1   Right flexor carpi ulnaris 15 1   Right flexor digitorum 10 1        Total Units Injected: 54.5    Unavoidable Waste: 45.5    Total Units Billed 100      The patient tolerated the injections without difficulty.      Assessment:    Enrique Long is a 69 year old male with primary writing tremor, a form of focal dystonia.  Today we did repeat botulinum toxin injections.    Plan  Follow-up in 3 months' time to consider repeat injections    Kelsi Hernandez MD    of Neurology

## 2020-05-14 NOTE — LETTER
5/14/2020       RE: Enrique Long  [address] [facility] [address]     Dear Colleague,    Thank you for referring your patient, Enrique Long, to the [facility] NEUROLOGY at [facility]. Please see a copy of my visit note below.    Movement Disorders Botulinum Toxin Clinic Note     Chief Complaint: primary writing tremor    History of Present Illness:  Enrique Long is a 69 year old male who presents to clinic for botulinum toxin injections for treatment of primary writing tremor, a form of focal dystonia.  This is first set of botox injections.     Diagnosed by Dr. Gautam with surface EMG mapping performed 3/12/20, which will guide today's injection pattern.      Current Outpatient Medications   Medication Sig Dispense Refill     aspirin 81 MG EC tablet Take 81 mg by mouth daily       atorvastatin (LIPITOR) 80 MG tablet Take 80 mg by mouth daily  0     clobetasol (TEMOVATE) 0.05 % GEL topical gel Apply  topically to affected area(s) 2 times daily.       dexamethasone AF (DECADRON) 0.1 MG/ML solution RINSE &SPIT 15ML FOR 2-3MIN, 2XDAILY X 4WK. DON'T SWALLOW. RINSE MOUTH WITH SALTWATER 30-40MIN AFTER  6     Efinaconazole 10 % SOLN as needed       furosemide (LASIX) 20 MG tablet Take 20 mg by mouth daily as needed       glucagon (GLUCAGEN HYPOKIT) 1 MG SOLR injection Inject 1 mg subcutaneously as needed. LW Addl Instr:Indicated for: Hypoglycemia       Glucosamine Sulfate 500 MG TABS Take 1 tablet by mouth daily       HEMP OIL OR EXTRACT OR OTHER CBD CANNABINOID, NOT MEDICAL CANNABIS,        insulin aspart (NOVOLOG PEN) 100 UNIT/ML pen INJECT 10 TO 15 UNITS SUBCUTANEOUSLY PREMEAL 3 TIMES DAILY AS DIRECTED       insulin glargine (BASAGLAR KWIKPEN) 100 UNIT/ML pen INJECT 30 UNITS SUBCUTANEOUSLY IN THE MORNING  0     loratadine (CLARITIN) 10 MG tablet Take 10 mg by mouth daily as needed       losartan (COZAAR) 100 MG tablet Take 100 mg by mouth daily  3      methylcellulose (CITRUCEL) 500 MG TABS tablet Take 1 capsule by mouth 2 times daily as needed       naproxen sodium (ALEVE) 220 MG tablet Take 2 tablets by mouth 2 times daily as needed        nitroGLYcerin (NITROSTAT) 0.4 MG sublingual tablet Place 0.4 mg under the tongue every 5 minutes as needed       Omega 3-6-9 Fatty Acids (RA OMEGA 3-6-9) CAPS Take 1 capsule by mouth daily       primidone (MYSOLINE) 50 MG tablet TAKE 2 TABLETS BY MOUTH TWICE DAILY, SEE SCHEDULE FROM CLINIC  2     sildenafil (REVATIO) 20 MG tablet USE 5 TABLETS ONCE DAILY AS NEEDED FOR ERECTILE DYSFUNCTION         Allergies: He is allergic to chocolate flavor; codeine; and lisinopril.    No past medical history on file.    No past surgical history on file.    Social History     Socioeconomic History     Marital status:      Spouse name: Not on file     Number of children: Not on file     Years of education: Not on file     Highest education level: Not on file   Occupational History     Not on file   Social Needs     Financial resource strain: Not on file     Food insecurity     Worry: Not on file     Inability: Not on file     Transportation needs     Medical: Not on file     Non-medical: Not on file   Tobacco Use     Smoking status: Never Smoker     Smokeless tobacco: Never Used   Substance and Sexual Activity     Alcohol use: Yes     Frequency: Monthly or less     Drinks per session: 1 or 2     Drug use: Never     Sexual activity: Not on file   Lifestyle     Physical activity     Days per week: Not on file     Minutes per session: Not on file     Stress: Not on file   Relationships     Social connections     Talks on phone: Not on file     Gets together: Not on file     Attends Confucianist service: Not on file     Active member of club or organization: Not on file     Attends meetings of clubs or organizations: Not on file     Relationship status: Not on file     Intimate partner violence     Fear of current or ex partner: Not on file      Emotionally abused: Not on file     Physically abused: Not on file     Forced sexual activity: Not on file   Other Topics Concern     Not on file   Social History Narrative     Not on file       No family history on file.    Physical Examination:  Vital Signs:   temperature is 98.6  F (37  C).   He is alert and oriented and has fluent speech without dysarthria and is able to provide an interval medical history  No rest tremor.   With writing he had marked 4-6 Hz tremor that makes his handwriting illegible.    BOTULINUM NEUROTOXIN INJECTION PROCEDURES:    VERIFICATION OF PATIENT IDENTIFICATION AND PROCEDURE     Initials   Patient Name LES   Patient  LES   Procedure Verified by: LES         Sedation (Moderate or Deep): None      Above assessments performed by:  Kelsi Hernandez MD      INDICATION/S FOR PROCEDURE/S:  Enrique Long is a 69 year old year old patient with dystonia affecting the  right upper extremity secondary to a diagnosis of primary writing tremor with associated  tremor and loss of volitional motor control.     His baseline symptoms have been recalcitrant to oral medications and conservative therapy.  He is here today for an injection of Botox.      GOAL OF PROCEDURE:  The goal of this procedure is to improve volitional motor control associated with dystonic movements.    TOTAL DOSE ADMINISTERED:  Dose Administered:  54.5 units Botox    Diluent Used:  Preservative Free Normal Saline  Total Volume of Diluent Used:  1 ml  Lot # [id number] with Expiration Date:  10/2022  NDC #: Botox 100u ([address]-31)      CONSENT:  The risks, benefits, and treatment options were discussed with Enrique Long and she agreed to proceed.      Written consent was obtained by LES.     EQUIPMENT USED:  Needle-37mm stimulating/recording    SKIN PREPARATION:  Skin preparation was performed using an alcohol wipe.    GUIDANCE DESCRIPTION:  Electro-myographic guidance was necessary throughout the procedure to accurately  identify all areas of dystonic muscles while avoiding injection of non-dystonic muscles, neighboring nerves and nearby vascular structures.     AREA/MUSCLE INJECTED:          Muscles Injected Units Injected Number of Injections   Right FDIH 2.5 1   Right extensor indicis proprius 7 1   Right extensor digitorum communis 20 1   Right flexor carpi ulnaris 15 1   Right flexor digitorum 10 1        Total Units Injected: 54.5    Unavoidable Waste: 45.5    Total Units Billed 100      The patient tolerated the injections without difficulty.      Assessment:    Enrique Long is a 69 year old male with primary writing tremor, a form of focal dystonia.  Today we did repeat botulinum toxin injections.    Plan  Follow-up in 3 months' time to consider repeat injections    Kelsi Hernandez MD    of Neurology

## 2020-05-14 NOTE — PATIENT INSTRUCTIONS
We did initial botulinum toxin injections for your writing tremor.    Please send a Kids Note message to reports the effects of the injections in 4 weeks. This will help us guide your next injection dose.    [facility] Movement Disorders Clinic  Chemical Neuronalysis with Botulinum Toxin  General Information and After-care Instructions    General Information  Botulinum toxin is a therapeutic agent derived from the bacterium, clostridium botulinum.  Botulinum toxin is injected into individual muscles in extremely small doses.  The goal is to lessen muscle overactivity by reducing the signal from nerves to muscles.     Uses  Botulinum toxin is approved to help with abnormal head position or neck pain (known as cervical dystonia or spasmodic torticollis), for eyelid spasms (known as blepharospasm), strabismus (crossed eyes), and spasticity.  We also use it in our clinic to treat other types of dystonia, drooling, and other conditions that cause involuntary movements, including hemifacial spasm, tremor, and tics (Tourette s syndrome).    Administration  The skin over the muscle to be injected is cleansed with alcohol. Electrical stimulation or electromyography (EMG) may be used to help localize the muscle to be injected.  Botulinum toxin is injected directly into individual muscles using a small needle.    Effect  The effect of Botulinum injections will usually be seen within 3-5 days.  The maximum benefit reached is usually reached in 1-2 weeks.  The effect lasts an average of about 3 months.  Patients may notice a reduction of muscle overactivity and some weakness of the targeted muscles.  Even if the beneficial effect is incomplete, the injections should generally not be performed more frequently than once every 3 months.  More frequent injections than this can result in resistance to the effects of the toxin.     Side Effects  Some people notice temporary weakness of muscles or mild discomfort at the  injection site.  Bleeding at an injection site or within individual muscles (hematoma) can occur Injections into limb muscles can result in excessive weakness of adjacent muscles of the arm or leg.  Fortunately, if managed appropriately, side effects of botulinum toxin are temporary and usually resolve in a few days or weeks.    In rare cases, the effect of botulinum toxin may affect areas of the body away from the injection site and cause symptoms of a serious condition called botulism.  Symptoms of botulism include loss of strength all over the body, double vision, blurred vision, drooping eyelids, loss of bladder control, trouble swallowing, hoarseness, and trouble speaking or breathing.    Post-injection Instructions  In general, do not massage the injection site for the first 24 hours to avoid spreading the Botulinum toxin to adjacent muscles.  Bleeding at the injection site or within individual muscles is treated by local compression for 15 minutes.  Excessive weakness of individual muscles is treated by waiting for the weakness to subside over days to weeks.      For patients receiving botulinum toxin for cervical dystonia, other focal dystonias, and spasticity, it is important to combine your treatment with stretching and range of motion exercises and, in some cases, to work closely with a physical therapist to achieve the maximum benefit from the therapy.

## 2020-05-22 ENCOUNTER — TELEPHONE (OUTPATIENT)
Dept: NEUROLOGY | Facility: CLINIC | Age: 70
End: 2020-05-22

## 2020-05-22 NOTE — TELEPHONE ENCOUNTER
I informed Lucio of Dr. Hernandez's note below and reaffirmed that this affect will start to go away in a few weeks and ultimately will resolve completely in about 3 months.

## 2020-05-22 NOTE — TELEPHONE ENCOUNTER
[facility] Call Center    Phone Message    May a detailed message be left on voicemail: yes     Reason for Call: Other: Feed back on Botox: Writing has improved 30-40%, all other functions of hand have been seriously impaired, so if this is what is going to happen then he is not going to go forward with next treatment. Hand is almost non functional.      Action Taken: Message routed to:  [facility] ([facility] Neuro    Travel Screening: Not Applicable

## 2021-01-15 ENCOUNTER — HEALTH MAINTENANCE LETTER (OUTPATIENT)
Age: 71
End: 2021-01-15

## 2021-02-09 ENCOUNTER — VIRTUAL VISIT (OUTPATIENT)
Dept: NEUROLOGY | Facility: CLINIC | Age: 71
End: 2021-02-09
Payer: MEDICARE

## 2021-02-09 DIAGNOSIS — G25.0 ESSENTIAL TREMOR: Primary | ICD-10-CM

## 2021-02-09 PROCEDURE — 99213 OFFICE O/P EST LOW 20 MIN: CPT | Mod: 95 | Performed by: PSYCHIATRY & NEUROLOGY

## 2021-02-09 RX ORDER — DOXYCYCLINE HYCLATE 100 MG
2 TABLET ORAL DAILY
COMMUNITY
Start: 2020-11-10 | End: 2024-03-04

## 2021-02-09 RX ORDER — PRIMIDONE 50 MG/1
TABLET ORAL
Qty: 120 TABLET | Refills: 11 | Status: SHIPPED | OUTPATIENT
Start: 2021-02-09

## 2021-02-09 RX ORDER — INSULIN ASPART INJECTION 100 [IU]/ML
50 INJECTION, SOLUTION SUBCUTANEOUS SEE ADMIN INSTRUCTIONS
COMMUNITY
Start: 2021-01-05

## 2021-02-09 NOTE — PROGRESS NOTES
Lucio is a 70 year old who is being evaluated via a billable video visit.      How would you like to obtain your AVS? Mychart  If the video visit is dropped, the invitation should be resent by: [phone]    Video Start Time: 2;31  Video-Visit Details    I changed the patient's scheduled in-person visit to a virtual video visit  because of the COVID19 crisis.  The rationale was to protect the patient from unnecessary interpersonal contact.    Chief complaint: Primary writing tremor    History of present illness:  Mr. Enrique Long is an extremely pleasant 70-year-old gentleman with right arm tremor that we believe his right arm primary writing tremor.  He had some response to primidone.  He had no response to topiramate and has been tapered off this.  We decided to give him a trial of botulinum toxin.  I had done a surface EMG study on him showing that forearm muscles were driving his tremor.  On 5/14/2020 Dr. Kelsi Hernandez gave a botulinum toxin injection.  This is into forearm flexors and extensors.  It included the extensor digitorum commonis, extensor indices proprius and flexors digitorum and flexor carpi ulnaris.    Unfortunately this almost immediately cause problems.  It caused extreme hand weakness to the point where he could not use his right hand for daily activities or writing.  It may have reduced the tremor slightly but not significantly.  It did not improve his functional writing ability.  He does not wish to repeat the injections.  As they were at low dose I do not think we can improve upon these injections.    He says he is learned to live with the writing problem.  He can use a keyboard with 2 fingers.  His only need for writing is really only with signing his signature.    Impression:  1.  Primary writing tremor  2.  Failure of botulinum toxin injection.    Recommendation:  1.  We will not repeat his botulinum toxin injection.  2.  Continue primidone 50 mg, 2 tablets twice daily.  We can  refill this as needed.    Won Gautam MD    25 minutes spent reviewing record, visiting with patient and preparing this report.  Type of service:  Video Visit    Video End Time:2;50    Originating Location (pt. Location): Home    Distant Location (provider location):  [facility] NEUROLOGY [facility]     Platform used for Video Visit: Elva Suh EMT

## 2021-02-09 NOTE — LETTER
2/9/2021       RE: Enrique Long  [address] [facility] [address]     Dear Colleague,    Thank you for referring your patient, Enrique Long, to the [facility] NEUROLOGY CLINIC [place] at [facility]. Please see a copy of my visit note below.    Lucio is a 70 year old who is being evaluated via a billable video visit.      How would you like to obtain your AVS? Mychart  If the video visit is dropped, the invitation should be resent by: [phone]    Video Start Time: 2;31  Video-Visit Details    I changed the patient's scheduled in-person visit to a virtual video visit  because of the COVID19 crisis.  The rationale was to protect the patient from unnecessary interpersonal contact.    Chief complaint: Primary writing tremor    History of present illness:  Mr. Enrique Long is an extremely pleasant 70-year-old gentleman with right arm tremor that we believe his right arm primary writing tremor.  He had some response to primidone.  He had no response to topiramate and has been tapered off this.  We decided to give him a trial of botulinum toxin.  I had done a surface EMG study on him showing that forearm muscles were driving his tremor.  On 5/14/2020 Dr. Kelsi Hernandez gave a botulinum toxin injection.  This is into forearm flexors and extensors.  It included the extensor digitorum commonis, extensor indices proprius and flexors digitorum and flexor carpi ulnaris.    Unfortunately this almost immediately cause problems.  It caused extreme hand weakness to the point where he could not use his right hand for daily activities or writing.  It may have reduced the tremor slightly but not significantly.  It did not improve his functional writing ability.  He does not wish to repeat the injections.  As they were at low dose I do not think we can improve upon these injections.    He says he is learned to live with the writing problem.  He can use a  keyboard with 2 fingers.  His only need for writing is really only with signing his signature.    Impression:  1.  Primary writing tremor  2.  Failure of botulinum toxin injection.    Recommendation:  1.  We will not repeat his botulinum toxin injection.  2.  Continue primidone 50 mg, 2 tablets twice daily.  We can refill this as needed.    Won Gautam MD    25 minutes spent reviewing record, visiting with patient and preparing this report.  Type of service:  Video Visit    Video End Time:2;50    Originating Location (pt. Location): Home    Distant Location (provider location):  [facility] NEUROLOGY [facility]     Platform used for Video Visit: Elva Suh, EMT          Again, thank you for allowing me to participate in the care of your patient.      Sincerely,    Won Gautam MD

## 2021-06-02 ENCOUNTER — RECORDS - HEALTHEAST (OUTPATIENT)
Dept: ADMINISTRATIVE | Facility: CLINIC | Age: 71
End: 2021-06-02

## 2021-09-04 ENCOUNTER — HEALTH MAINTENANCE LETTER (OUTPATIENT)
Age: 71
End: 2021-09-04

## 2022-02-19 ENCOUNTER — HEALTH MAINTENANCE LETTER (OUTPATIENT)
Age: 72
End: 2022-02-19

## 2022-10-16 ENCOUNTER — HEALTH MAINTENANCE LETTER (OUTPATIENT)
Age: 72
End: 2022-10-16

## 2023-04-01 ENCOUNTER — HEALTH MAINTENANCE LETTER (OUTPATIENT)
Age: 73
End: 2023-04-01

## 2023-07-21 ENCOUNTER — TRANSCRIBE ORDERS (OUTPATIENT)
Dept: OTHER | Age: 73
End: 2023-07-21

## 2023-07-21 DIAGNOSIS — M79.10 MYALGIA: ICD-10-CM

## 2023-07-21 DIAGNOSIS — M62.81 MUSCLE WEAKNESS: Primary | ICD-10-CM

## 2024-03-04 ENCOUNTER — HOSPITAL ENCOUNTER (INPATIENT)
Facility: CLINIC | Age: 74
LOS: 6 days | Discharge: HOME | DRG: 871 | End: 2024-03-10
Attending: EMERGENCY MEDICINE | Admitting: HOSPITALIST
Payer: MEDICARE

## 2024-03-04 ENCOUNTER — APPOINTMENT (OUTPATIENT)
Dept: CT IMAGING | Facility: CLINIC | Age: 74
DRG: 871 | End: 2024-03-04
Attending: EMERGENCY MEDICINE
Payer: MEDICARE

## 2024-03-04 ENCOUNTER — APPOINTMENT (OUTPATIENT)
Dept: ULTRASOUND IMAGING | Facility: CLINIC | Age: 74
DRG: 871 | End: 2024-03-04
Attending: EMERGENCY MEDICINE
Payer: MEDICARE

## 2024-03-04 DIAGNOSIS — L03.311 CELLULITIS OF ABDOMINAL WALL: ICD-10-CM

## 2024-03-04 DIAGNOSIS — Z29.9 NEED FOR PROPHYLACTIC MEASURE: ICD-10-CM

## 2024-03-04 DIAGNOSIS — R52 PAIN: Primary | ICD-10-CM

## 2024-03-04 DIAGNOSIS — R65.20 SEVERE SEPSIS (H): ICD-10-CM

## 2024-03-04 DIAGNOSIS — A41.9 SEVERE SEPSIS (H): ICD-10-CM

## 2024-03-04 DIAGNOSIS — I10 BENIGN ESSENTIAL HYPERTENSION: ICD-10-CM

## 2024-03-04 LAB
ALBUMIN SERPL BCG-MCNC: 3.7 G/DL (ref 3.5–5.2)
ALP SERPL-CCNC: 93 U/L (ref 40–150)
ALT SERPL W P-5'-P-CCNC: 61 U/L (ref 0–70)
ANION GAP SERPL CALCULATED.3IONS-SCNC: 16 MMOL/L (ref 7–15)
AST SERPL W P-5'-P-CCNC: 78 U/L (ref 0–45)
BASOPHILS # BLD AUTO: 0.1 10E3/UL (ref 0–0.2)
BASOPHILS NFR BLD AUTO: 0 %
BILIRUB SERPL-MCNC: 0.6 MG/DL
BUN SERPL-MCNC: 22.9 MG/DL (ref 8–23)
CALCIUM SERPL-MCNC: 9 MG/DL (ref 8.8–10.2)
CHLORIDE SERPL-SCNC: 104 MMOL/L (ref 98–107)
CK SERPL-CCNC: 335 U/L (ref 39–308)
CREAT SERPL-MCNC: 1.52 MG/DL (ref 0.67–1.17)
CRP SERPL-MCNC: 53.52 MG/L
DEPRECATED HCO3 PLAS-SCNC: 21 MMOL/L (ref 22–29)
EGFRCR SERPLBLD CKD-EPI 2021: 47 ML/MIN/1.73M2
EOSINOPHIL # BLD AUTO: 0 10E3/UL (ref 0–0.7)
EOSINOPHIL NFR BLD AUTO: 0 %
ERYTHROCYTE [DISTWIDTH] IN BLOOD BY AUTOMATED COUNT: 16 % (ref 10–15)
GLUCOSE BLDC GLUCOMTR-MCNC: 148 MG/DL (ref 70–99)
GLUCOSE BLDC GLUCOMTR-MCNC: 92 MG/DL (ref 70–99)
GLUCOSE SERPL-MCNC: 198 MG/DL (ref 70–99)
HBA1C MFR BLD: 6.9 %
HCT VFR BLD AUTO: 36 % (ref 40–53)
HGB BLD-MCNC: 11.4 G/DL (ref 13.3–17.7)
HOLD SPECIMEN: NORMAL
IMM GRANULOCYTES # BLD: 0.4 10E3/UL
IMM GRANULOCYTES NFR BLD: 2 %
LACTATE SERPL-SCNC: 2.8 MMOL/L (ref 0.7–2)
LACTATE SERPL-SCNC: 3.4 MMOL/L (ref 0.7–2)
LACTATE SERPL-SCNC: 3.4 MMOL/L (ref 0.7–2)
LIPASE SERPL-CCNC: 8 U/L (ref 13–60)
LYMPHOCYTES # BLD AUTO: 0.2 10E3/UL (ref 0.8–5.3)
LYMPHOCYTES NFR BLD AUTO: 1 %
MCH RBC QN AUTO: 21.9 PG (ref 26.5–33)
MCHC RBC AUTO-ENTMCNC: 31.7 G/DL (ref 31.5–36.5)
MCV RBC AUTO: 69 FL (ref 78–100)
MONOCYTES # BLD AUTO: 1.5 10E3/UL (ref 0–1.3)
MONOCYTES NFR BLD AUTO: 7 %
MRSA DNA SPEC QL NAA+PROBE: NEGATIVE
NEUTROPHILS # BLD AUTO: 19.2 10E3/UL (ref 1.6–8.3)
NEUTROPHILS NFR BLD AUTO: 90 %
NRBC # BLD AUTO: 0 10E3/UL
NRBC BLD AUTO-RTO: 0 /100
PLATELET # BLD AUTO: 223 10E3/UL (ref 150–450)
POTASSIUM SERPL-SCNC: 4.2 MMOL/L (ref 3.4–5.3)
PROT SERPL-MCNC: 6.2 G/DL (ref 6.4–8.3)
RBC # BLD AUTO: 5.21 10E6/UL (ref 4.4–5.9)
SA TARGET DNA: POSITIVE
SODIUM SERPL-SCNC: 141 MMOL/L (ref 135–145)
WBC # BLD AUTO: 21.4 10E3/UL (ref 4–11)

## 2024-03-04 PROCEDURE — 258N000003 HC RX IP 258 OP 636: Performed by: INTERNAL MEDICINE

## 2024-03-04 PROCEDURE — 82040 ASSAY OF SERUM ALBUMIN: CPT | Performed by: EMERGENCY MEDICINE

## 2024-03-04 PROCEDURE — 36415 COLL VENOUS BLD VENIPUNCTURE: CPT | Performed by: EMERGENCY MEDICINE

## 2024-03-04 PROCEDURE — 82550 ASSAY OF CK (CPK): CPT | Performed by: INTERNAL MEDICINE

## 2024-03-04 PROCEDURE — 99222 1ST HOSP IP/OBS MODERATE 55: CPT | Performed by: INTERNAL MEDICINE

## 2024-03-04 PROCEDURE — 250N000011 HC RX IP 250 OP 636: Performed by: HOSPITALIST

## 2024-03-04 PROCEDURE — 96375 TX/PRO/DX INJ NEW DRUG ADDON: CPT

## 2024-03-04 PROCEDURE — 99285 EMERGENCY DEPT VISIT HI MDM: CPT | Mod: 25

## 2024-03-04 PROCEDURE — 83605 ASSAY OF LACTIC ACID: CPT | Performed by: HOSPITALIST

## 2024-03-04 PROCEDURE — 85004 AUTOMATED DIFF WBC COUNT: CPT | Performed by: EMERGENCY MEDICINE

## 2024-03-04 PROCEDURE — 258N000003 HC RX IP 258 OP 636: Performed by: HOSPITALIST

## 2024-03-04 PROCEDURE — 120N000001 HC R&B MED SURG/OB

## 2024-03-04 PROCEDURE — 86140 C-REACTIVE PROTEIN: CPT | Performed by: EMERGENCY MEDICINE

## 2024-03-04 PROCEDURE — 250N000011 HC RX IP 250 OP 636: Performed by: EMERGENCY MEDICINE

## 2024-03-04 PROCEDURE — 83690 ASSAY OF LIPASE: CPT | Performed by: EMERGENCY MEDICINE

## 2024-03-04 PROCEDURE — 83605 ASSAY OF LACTIC ACID: CPT | Performed by: EMERGENCY MEDICINE

## 2024-03-04 PROCEDURE — 250N000009 HC RX 250: Performed by: EMERGENCY MEDICINE

## 2024-03-04 PROCEDURE — 250N000013 HC RX MED GY IP 250 OP 250 PS 637: Performed by: HOSPITALIST

## 2024-03-04 PROCEDURE — 99223 1ST HOSP IP/OBS HIGH 75: CPT | Mod: AI | Performed by: HOSPITALIST

## 2024-03-04 PROCEDURE — 83036 HEMOGLOBIN GLYCOSYLATED A1C: CPT | Performed by: HOSPITALIST

## 2024-03-04 PROCEDURE — 96376 TX/PRO/DX INJ SAME DRUG ADON: CPT

## 2024-03-04 PROCEDURE — 74177 CT ABD & PELVIS W/CONTRAST: CPT | Mod: MG

## 2024-03-04 PROCEDURE — 87040 BLOOD CULTURE FOR BACTERIA: CPT | Performed by: EMERGENCY MEDICINE

## 2024-03-04 PROCEDURE — 96361 HYDRATE IV INFUSION ADD-ON: CPT

## 2024-03-04 PROCEDURE — 258N000003 HC RX IP 258 OP 636: Performed by: EMERGENCY MEDICINE

## 2024-03-04 PROCEDURE — 36415 COLL VENOUS BLD VENIPUNCTURE: CPT | Performed by: HOSPITALIST

## 2024-03-04 PROCEDURE — 96365 THER/PROPH/DIAG IV INF INIT: CPT

## 2024-03-04 PROCEDURE — 87640 STAPH A DNA AMP PROBE: CPT | Performed by: HOSPITALIST

## 2024-03-04 PROCEDURE — 76705 ECHO EXAM OF ABDOMEN: CPT

## 2024-03-04 PROCEDURE — 87641 MR-STAPH DNA AMP PROBE: CPT | Performed by: HOSPITALIST

## 2024-03-04 PROCEDURE — 250N000011 HC RX IP 250 OP 636: Mod: JZ | Performed by: INTERNAL MEDICINE

## 2024-03-04 RX ORDER — LOSARTAN POTASSIUM AND HYDROCHLOROTHIAZIDE 25; 100 MG/1; MG/1
1 TABLET ORAL DAILY
Status: ON HOLD | COMMUNITY
End: 2024-03-10

## 2024-03-04 RX ORDER — MORPHINE SULFATE 4 MG/ML
4 INJECTION, SOLUTION INTRAMUSCULAR; INTRAVENOUS
Status: DISCONTINUED | OUTPATIENT
Start: 2024-03-04 | End: 2024-03-04 | Stop reason: ALTCHOICE

## 2024-03-04 RX ORDER — ROSUVASTATIN CALCIUM 10 MG/1
10 TABLET, COATED ORAL DAILY
COMMUNITY

## 2024-03-04 RX ORDER — ROSUVASTATIN CALCIUM 10 MG/1
10 TABLET, COATED ORAL DAILY
Status: DISCONTINUED | OUTPATIENT
Start: 2024-03-04 | End: 2024-03-10 | Stop reason: HOSPADM

## 2024-03-04 RX ORDER — OXYCODONE HYDROCHLORIDE 5 MG/1
5 TABLET ORAL EVERY 6 HOURS PRN
Status: DISCONTINUED | OUTPATIENT
Start: 2024-03-04 | End: 2024-03-10 | Stop reason: HOSPADM

## 2024-03-04 RX ORDER — MULTIVITAMIN,THERAPEUTIC
1 TABLET ORAL DAILY
COMMUNITY

## 2024-03-04 RX ORDER — PIPERACILLIN SODIUM, TAZOBACTAM SODIUM 4; .5 G/20ML; G/20ML
4.5 INJECTION, POWDER, LYOPHILIZED, FOR SOLUTION INTRAVENOUS ONCE
Status: COMPLETED | OUTPATIENT
Start: 2024-03-04 | End: 2024-03-04

## 2024-03-04 RX ORDER — AMOXICILLIN 250 MG
1 CAPSULE ORAL 2 TIMES DAILY PRN
Status: DISCONTINUED | OUTPATIENT
Start: 2024-03-04 | End: 2024-03-10 | Stop reason: HOSPADM

## 2024-03-04 RX ORDER — MORPHINE SULFATE 4 MG/ML
4 INJECTION, SOLUTION INTRAMUSCULAR; INTRAVENOUS ONCE
Status: COMPLETED | OUTPATIENT
Start: 2024-03-04 | End: 2024-03-04

## 2024-03-04 RX ORDER — NICOTINE POLACRILEX 4 MG
15-30 LOZENGE BUCCAL
Status: CANCELLED | OUTPATIENT
Start: 2024-03-04

## 2024-03-04 RX ORDER — NALOXONE HYDROCHLORIDE 0.4 MG/ML
0.4 INJECTION, SOLUTION INTRAMUSCULAR; INTRAVENOUS; SUBCUTANEOUS
Status: DISCONTINUED | OUTPATIENT
Start: 2024-03-04 | End: 2024-03-10 | Stop reason: HOSPADM

## 2024-03-04 RX ORDER — DEXTROSE MONOHYDRATE 100 MG/ML
INJECTION, SOLUTION INTRAVENOUS CONTINUOUS PRN
Status: CANCELLED | OUTPATIENT
Start: 2024-03-04

## 2024-03-04 RX ORDER — LIDOCAINE 40 MG/G
CREAM TOPICAL
Status: DISCONTINUED | OUTPATIENT
Start: 2024-03-04 | End: 2024-03-10 | Stop reason: HOSPADM

## 2024-03-04 RX ORDER — IOPAMIDOL 755 MG/ML
85 INJECTION, SOLUTION INTRAVASCULAR ONCE
Status: COMPLETED | OUTPATIENT
Start: 2024-03-04 | End: 2024-03-04

## 2024-03-04 RX ORDER — PROPRANOLOL HCL 60 MG
60 CAPSULE, EXTENDED RELEASE 24HR ORAL DAILY
COMMUNITY

## 2024-03-04 RX ORDER — NALOXONE HYDROCHLORIDE 0.4 MG/ML
0.2 INJECTION, SOLUTION INTRAMUSCULAR; INTRAVENOUS; SUBCUTANEOUS
Status: DISCONTINUED | OUTPATIENT
Start: 2024-03-04 | End: 2024-03-10 | Stop reason: HOSPADM

## 2024-03-04 RX ORDER — AMLODIPINE BESYLATE 2.5 MG/1
2.5 TABLET ORAL DAILY
Status: DISCONTINUED | OUTPATIENT
Start: 2024-03-04 | End: 2024-03-09

## 2024-03-04 RX ORDER — PRIMIDONE 50 MG/1
100 TABLET ORAL AT BEDTIME
Status: DISCONTINUED | OUTPATIENT
Start: 2024-03-04 | End: 2024-03-10 | Stop reason: HOSPADM

## 2024-03-04 RX ORDER — HYDROMORPHONE HCL IN WATER/PF 6 MG/30 ML
0.2 PATIENT CONTROLLED ANALGESIA SYRINGE INTRAVENOUS EVERY 6 HOURS PRN
Status: DISCONTINUED | OUTPATIENT
Start: 2024-03-04 | End: 2024-03-10 | Stop reason: HOSPADM

## 2024-03-04 RX ORDER — ONDANSETRON 2 MG/ML
4 INJECTION INTRAMUSCULAR; INTRAVENOUS EVERY 30 MIN PRN
Status: DISCONTINUED | OUTPATIENT
Start: 2024-03-04 | End: 2024-03-09

## 2024-03-04 RX ORDER — AMOXICILLIN 250 MG
2 CAPSULE ORAL 2 TIMES DAILY PRN
Status: DISCONTINUED | OUTPATIENT
Start: 2024-03-04 | End: 2024-03-10 | Stop reason: HOSPADM

## 2024-03-04 RX ORDER — AMLODIPINE BESYLATE 2.5 MG/1
2.5 TABLET ORAL DAILY
Status: ON HOLD | COMMUNITY
End: 2024-03-10

## 2024-03-04 RX ORDER — CALCIUM CARBONATE 500 MG/1
1000 TABLET, CHEWABLE ORAL 4 TIMES DAILY PRN
Status: DISCONTINUED | OUTPATIENT
Start: 2024-03-04 | End: 2024-03-10 | Stop reason: HOSPADM

## 2024-03-04 RX ORDER — PROPRANOLOL HCL 60 MG
60 CAPSULE, EXTENDED RELEASE 24HR ORAL DAILY
Status: DISCONTINUED | OUTPATIENT
Start: 2024-03-04 | End: 2024-03-10 | Stop reason: HOSPADM

## 2024-03-04 RX ORDER — ASPIRIN 81 MG/1
81 TABLET ORAL DAILY
Status: DISCONTINUED | OUTPATIENT
Start: 2024-03-04 | End: 2024-03-10 | Stop reason: HOSPADM

## 2024-03-04 RX ORDER — ACETAMINOPHEN 325 MG/1
650 TABLET ORAL EVERY 6 HOURS PRN
Status: DISCONTINUED | OUTPATIENT
Start: 2024-03-04 | End: 2024-03-10 | Stop reason: HOSPADM

## 2024-03-04 RX ORDER — ONDANSETRON 2 MG/ML
4 INJECTION INTRAMUSCULAR; INTRAVENOUS ONCE
Status: COMPLETED | OUTPATIENT
Start: 2024-03-04 | End: 2024-03-04

## 2024-03-04 RX ORDER — DEXTROSE MONOHYDRATE 25 G/50ML
25-50 INJECTION, SOLUTION INTRAVENOUS
Status: CANCELLED | OUTPATIENT
Start: 2024-03-04

## 2024-03-04 RX ORDER — NICOTINE POLACRILEX 4 MG
15-30 LOZENGE BUCCAL
Status: DISCONTINUED | OUTPATIENT
Start: 2024-03-04 | End: 2024-03-10 | Stop reason: HOSPADM

## 2024-03-04 RX ORDER — DEXTROSE MONOHYDRATE 25 G/50ML
25-50 INJECTION, SOLUTION INTRAVENOUS
Status: DISCONTINUED | OUTPATIENT
Start: 2024-03-04 | End: 2024-03-10 | Stop reason: HOSPADM

## 2024-03-04 RX ORDER — PIPERACILLIN SODIUM, TAZOBACTAM SODIUM 3; .375 G/15ML; G/15ML
3.38 INJECTION, POWDER, LYOPHILIZED, FOR SOLUTION INTRAVENOUS EVERY 6 HOURS
Status: DISCONTINUED | OUTPATIENT
Start: 2024-03-04 | End: 2024-03-05

## 2024-03-04 RX ORDER — MULTIVITAMIN,THERAPEUTIC
1 TABLET ORAL DAILY
Status: DISCONTINUED | OUTPATIENT
Start: 2024-03-04 | End: 2024-03-10 | Stop reason: HOSPADM

## 2024-03-04 RX ADMIN — PIPERACILLIN AND TAZOBACTAM 3.38 G: 3; .375 INJECTION, POWDER, FOR SOLUTION INTRAVENOUS at 14:12

## 2024-03-04 RX ADMIN — VANCOMYCIN HYDROCHLORIDE 1750 MG: 10 INJECTION, POWDER, LYOPHILIZED, FOR SOLUTION INTRAVENOUS at 08:35

## 2024-03-04 RX ADMIN — SODIUM CHLORIDE, POTASSIUM CHLORIDE, SODIUM LACTATE AND CALCIUM CHLORIDE 1500 ML: 600; 310; 30; 20 INJECTION, SOLUTION INTRAVENOUS at 08:16

## 2024-03-04 RX ADMIN — IOPAMIDOL 85 ML: 755 INJECTION, SOLUTION INTRAVENOUS at 07:45

## 2024-03-04 RX ADMIN — MORPHINE SULFATE 4 MG: 4 INJECTION, SOLUTION INTRAMUSCULAR; INTRAVENOUS at 08:16

## 2024-03-04 RX ADMIN — AMLODIPINE BESYLATE 2.5 MG: 2.5 TABLET ORAL at 14:10

## 2024-03-04 RX ADMIN — PRIMIDONE 100 MG: 50 TABLET ORAL at 21:34

## 2024-03-04 RX ADMIN — ACETAMINOPHEN 650 MG: 325 TABLET, FILM COATED ORAL at 14:11

## 2024-03-04 RX ADMIN — ONDANSETRON 4 MG: 2 INJECTION INTRAMUSCULAR; INTRAVENOUS at 06:54

## 2024-03-04 RX ADMIN — PIPERACILLIN AND TAZOBACTAM 4.5 G: 4; .5 INJECTION, POWDER, FOR SOLUTION INTRAVENOUS at 07:38

## 2024-03-04 RX ADMIN — DAPTOMYCIN 500 MG: 500 INJECTION, POWDER, LYOPHILIZED, FOR SOLUTION INTRAVENOUS at 14:56

## 2024-03-04 RX ADMIN — ASPIRIN 81 MG: 81 TABLET, COATED ORAL at 14:10

## 2024-03-04 RX ADMIN — SODIUM CHLORIDE 1000 ML: 9 INJECTION, SOLUTION INTRAVENOUS at 15:30

## 2024-03-04 RX ADMIN — MORPHINE SULFATE 4 MG: 4 INJECTION, SOLUTION INTRAMUSCULAR; INTRAVENOUS at 06:54

## 2024-03-04 RX ADMIN — MULTIVITAMIN TABLET 1 TABLET: TABLET at 14:11

## 2024-03-04 RX ADMIN — ROSUVASTATIN CALCIUM 10 MG: 10 TABLET, FILM COATED ORAL at 14:10

## 2024-03-04 RX ADMIN — PIPERACILLIN AND TAZOBACTAM 3.38 G: 3; .375 INJECTION, POWDER, FOR SOLUTION INTRAVENOUS at 20:52

## 2024-03-04 RX ADMIN — SODIUM CHLORIDE 64 ML: 9 INJECTION, SOLUTION INTRAVENOUS at 07:45

## 2024-03-04 ASSESSMENT — ACTIVITIES OF DAILY LIVING (ADL)
ADLS_ACUITY_SCORE: 35
ADLS_ACUITY_SCORE: 29
ADLS_ACUITY_SCORE: 35
ADLS_ACUITY_SCORE: 28
ADLS_ACUITY_SCORE: 29
ADLS_ACUITY_SCORE: 35
ADLS_ACUITY_SCORE: 29
ADLS_ACUITY_SCORE: 35
ADLS_ACUITY_SCORE: 29
ADLS_ACUITY_SCORE: 28
ADLS_ACUITY_SCORE: 22
ADLS_ACUITY_SCORE: 35
ADLS_ACUITY_SCORE: 29

## 2024-03-04 ASSESSMENT — COLUMBIA-SUICIDE SEVERITY RATING SCALE - C-SSRS
6. HAVE YOU EVER DONE ANYTHING, STARTED TO DO ANYTHING, OR PREPARED TO DO ANYTHING TO END YOUR LIFE?: NO
2. HAVE YOU ACTUALLY HAD ANY THOUGHTS OF KILLING YOURSELF IN THE PAST MONTH?: NO
1. IN THE PAST MONTH, HAVE YOU WISHED YOU WERE DEAD OR WISHED YOU COULD GO TO SLEEP AND NOT WAKE UP?: NO

## 2024-03-04 NOTE — PROGRESS NOTES
Admission    Patient arrives to room 636 via cart from ER.  Care plan note:   DATE & TIME: 3/4/24 [id number]    Cognitive Concerns/ Orientation : A&O x4  BEHAVIOR & AGGRESSION TOOL COLOR: green  CIWA SCORE: NA   ABNL VS/O2: VSS on RA. Temp 100.8 F  MOBILITY: Assist x1  PAIN MANAGMENT: tylenol given x1 for pain on L abd site  DIET: Mod carb  BOWEL/BLADDER: continent, assist to bathroom  ABNL LAB/BG: Lactic acid 3.4- recheck was 2.8 after IV fluid bolus in ER, MD aware. Creat 1.52. WBC 21.4  DRAIN/DEVICES: L PIV running daptomycin 200 ml.hr  TELEMETRY RHYTHM: NA  SKIN: L abd cellulitis that extends to L side. Healing cut on L heel   TESTS/PROCEDURES: MRSA swab taken. US and CT completed in ER  D/C DATE: pending  OTHER IMPORTANT INFO: Fluid bolus ordered for lactic. Uses Omnipod insulin pump and Dexcom CGM. Explained that insulin boluses should be given based off hospital BG test not dexcom.       Inpatient nursing criteria listed below were met:    Did you put disposition on whiteboard and in sticky note: Yes  Full skin assessment done (add LDA if skin issue present). Initials of 2nd RN :Yes SS  Isolation education started/completed NA  Patient allergies verified with patient: Yes  Fall Risk? (Care plan updated, Education given and documented) Yes  Primary Care Plan initiated: Yes  Home medications documented in belongings flowsheet: NA  Patient belongings documented in belongings flowsheet: Yes  Reminder note (belongings/ medications) placed in discharge instructions:NA  Admission profile/ required documentation complete: Yes  If patient is a 72 hour hold/Commitment are belongings removed from room and locked up? NA

## 2024-03-04 NOTE — ED TRIAGE NOTES
The patient replaced his omnipod yesterday morning. Near the injection site of the omnipod that was removed the patient developed pain last night. The patient is a T1DM. He denies blood thinners. He denies any bowel or urinary symptoms. Movement makes the pain worse. Area of firmness and redness noted at the site.      Triage Assessment (Adult)       Row Name 03/04/24 0620          Triage Assessment    Airway WDL WDL        Respiratory WDL    Respiratory WDL WDL        Skin Circulation/Temperature WDL    Skin Circulation/Temperature WDL X  left lower abdomen with redness and harden area around previous needle site        Cardiac WDL    Cardiac WDL WDL        Peripheral/Neurovascular WDL    Peripheral Neurovascular WDL WDL        Cognitive/Neuro/Behavioral WDL    Cognitive/Neuro/Behavioral WDL WDL

## 2024-03-04 NOTE — H&P
[facility]    History and Physical  Hospitalist       Date of Admission:  3/4/2024    Assessment & Plan   Enrique Long is a 73 year old male PMH followed by [facility]  diabetes type 1 with insulin pump, HFpEF presents to the ED with severe abdominal pain with erythema at insulin pump site onset yesterday     Abdominal wall cellulitis at insulin pump site  Sepsis 2' above  Enrique Long is a 73 year old male with a past medical history significant for diabetes type 1, CHF who presents to the ED via/accompanied by wife with a chief complaint of severe abdominal pain onset yesterday after switching the site of the OmniPod with associated erythema surrounding site.    In ED, afebrile, WBC 21.4 K, CRP 53.52.  LA 3.4, CR 1.52, normal BUN, random glucose 198, CT abdomen left skin thickening and subcu soft tissue stranding, no abscess or foreign body appreciated.  Soft tissue ultrasound pending.  Started on Zosyn and Vanco.  Given LR 1.5 L bolus.  Follow-up LA pending.  -Continue Zosyn/Vanco, pharmacy to dose Vanco.  Check nasal swab MRSA/MSSA.  -Follow-up soft tissue ultrasound, if FB General surgery consult  -Follow-up BC, follow WBC, CRP, temperature profile  -Optimize glycemic control, continue insulin pump protocol.  -Continue pain management as currently, however suspect once control of cellulitis/infection, pain should remit.  -ID consult      Type 1 diabetes, 68 years on insulin pump  Diabetic neuropathy, lower extremity  Albuminuria  Followed by Dr. Garcia at, endocrinology, [facility].  -Continue insulin pump protocol, basal rate, with carb counting,   Noted insulin pump injection site changed 3/3/2024  -Advance diet as tolerated to modified carb diet.  -Monitor glucoses, adjust insulin regimen accordingly.  -Follow-up with Dr. Garcia as outpatient on hospital discharge.    HFpEF, appears compensated  Hypertension.   -Controlled  -Continue current medications  PTA on ASA 81 mg,  continue, amlodipine, Hyzaar, furosemide 20 mg as needed for LE edema, hold for now.  -Continue PTA amlodipine, hold losartan, HCTZ and furosemide for now given ITALIA.  -Follow-up [facility] as outpatient on hospital discharge    ITALIA  Last CR on record 7/2023 at [facility] 1.0.  Suspect volume depletion, patient is diabetic but no history of diabetic neuropathy except for albuminuria, given LR 1.5 L.  Recheck CR/BMP in a.m.  -For now hold off HCTZ, furosemide and losartan.    Essential tremor  Patient reports longstanding, on Mysoline, continue      DVT Prophylaxis: Pneumatic Compression Devices  Code Status: Full Code.  Discussed and confirmed CODE STATUS with patient and wife on hospital admission.     Expected Discharge Date: >2 days    Zach Oliveros MD    Total time 50 minutes for today 3/4/2024 :   time consisted of the following, assuming Patient care with review of records including labs, imaging results, medications, interdisciplinary notes; examination of Patient;  and completing documentation and orders.  Care Management included counseling/discussion with Patient and Wife regarding current condition including cellulitis/sepsis; type I diabeates and Coordination of Care time with Nursing, PharmD  and  ED Provider regarding care plan, management and surveillance, as above.     Primary Care Physician   Followed by [facility]    Chief Complaint   Abdominal pain and erythema and insulin pump site    History is obtained from the patient, Wife and ED Provider    History of Present Illness   Enrique Long is a 73 year old male with a past medical history significant for diabetes type 1, CHF who presents to the ED via/accompanied by wife with a chief complaint of severe abdominal pain onset yesterday after switching the site of the OmniPod with associated erythema surrounding site.  No AC except for aspirin 81 mg, no history of recurrent cellulitis, no artificial valve or  joint except for pain, left ankle, 1998.  In ED, afebrile, WBC 21.4 K, CRP 53.52.  LA 3.4, CR 1.52, normal BUN, random glucose 198, CT abdomen left skin thickening and subcu soft tissue stranding, no abscess or foreign body appreciated.  Soft tissue ultrasound pending.  Started on Zosyn and Vanco.  Given LR 1.5 L bolus.   Remainder of HPI as above.          Allergies   Allergies   Allergen Reactions    Chocolate Flavor      Mouth pain    Codeine GI Disturbance and Other (See Comments)     PN: LW Reaction: gi upset      Lisinopril Cough       Social History   I have reviewed this patient's social history and updated it with pertinent information if needed. Enrique Long  reports that he has never smoked. He has never used smokeless tobacco. He reports current alcohol use. He reports that he does not use drugs.  Retired, lives with wife, PTA independent.    Review of Systems   Complete ROS as above.    Physical Exam   Temp: 98.2  F (36.8  C) Temp src: Oral BP: 121/58 Pulse: 80   Resp: 18 SpO2: 96 % O2 Device: None (Room air)      General/Constitutional:   NAD, alert, calm, cooperative    Chest/Respiratory: Respirations nonlabored room air  Cardiovascular:  regular, no murmur appreciated.  LE edema none  Gastrointestinal/Abdomen:  soft, nontender, no rebound, guarding or other peritoneal signs.  Diffuse erythema left upper quadrant at prior insulin pump injection site.  Insulin pump noted above prior site, clear.   Neuro.  Gross motor tested, nonfocal,  Psych oriented, affect calm     Data   Data reviewed today:   Recent Labs   Lab 03/04/24  0652   WBC 21.4*   HGB 11.4*   MCV 69*         POTASSIUM 4.2   CHLORIDE 104   CO2 21*   BUN 22.9   CR 1.52*   ANIONGAP 16*   PILY 9.0   *   ALBUMIN 3.7   PROTTOTAL 6.2*   BILITOTAL 0.6   ALKPHOS 93   ALT 61   AST 78*   LIPASE 8*       Recent Results (from the past 24 hour(s))   CT Abdomen Pelvis w Contrast    Narrative    CT ABDOMEN/PELVIS WITH CONTRAST March  4, 2024 7:53 AM    CLINICAL HISTORY: Left lower quadrant abdominal pain, likely  cellulitis, evaluate abscess or retained foreign body.    TECHNIQUE: CT scan of the abdomen and pelvis was performed following  injection of IV contrast. Multiplanar reformats were obtained. Dose  reduction techniques were used.  CONTRAST: 85 mL Isovue-370.    COMPARISON: None.    FINDINGS:   LOWER CHEST: Tiny hiatal hernia.    HEPATOBILIARY: No significant mass or bile duct dilatation. No  calcified gallstones.     PANCREAS: No significant mass, duct dilatation, or inflammatory  change.    SPLEEN: Normal size.    ADRENAL GLANDS: Unremarkable.    KIDNEYS/BLADDER: No significant mass, stones, or hydronephrosis.    BOWEL: No obstruction or inflammatory change.    PELVIC ORGANS: Unremarkable.    ADDITIONAL FINDINGS: No ascites. No enlarged lymph nodes.  Nonaneurysmal abdominal aorta. Mild atherosclerosis.    MUSCULOSKELETAL: Left abdominal skin thickening and subcutaneous  stranding. No organized fluid collection or radiopaque foreign body.  No aggressive osseous lesion. Degenerative changes of the lumbar  spine, most significant at L2-L3.      Impression    IMPRESSION: Left abdominal skin thickening and subcutaneous soft  tissue stranding consistent with reported cellulitis. No abscess or  radiopaque foreign body.    COMPA NAVARRETE MD         SYSTEM ID:  [id number]   US Soft Tissue Abdominal Wall or Lower Back    Narrative    US SOFT TISSUE ABDOMINAL WALL OR LOWER BACK  3/4/2024 9:42 AM     HISTORY: Left lower quadrant erythema, evaluate signs of retained  foreign body from previous insulin pump site.    COMPARISON: None.      Impression    IMPRESSION: Transabdominal imaging of the area of tenderness in the  left abdomen demonstrates no fluid or shadowing foreign bodies. Soft  tissue edema evident.

## 2024-03-04 NOTE — ED PROVIDER NOTES
History     Chief Complaint:  Abdominal Pain       HPI   Enrique Long is a 73 year old male with a past medical history significant for diabetes type 1, CHF who presents to the ED via/accompanied by wife with a chief complaint of severe abdominal pain onset yesterday after switching the site of the OmniPod with associated erythema surrounding site.  Pain is worse with movement.  Patient denies nausea, vomiting, changes in urination, changes in bowel movements, chest pain, shortness of breath.      Independent Historian: history provided by the patient    Review of External Notes: See MDM        Allergies:  Chocolate Flavor  Codeine  Lisinopril     Medications:    INSULIN PUMP - OUTPATIENT  aspirin 81 MG EC tablet  atorvastatin (LIPITOR) 80 MG tablet  clobetasol (TEMOVATE) 0.05 % GEL topical gel  dexamethasone AF (DECADRON) 0.1 MG/ML solution  doxycycline hyclate (VIBRA-TABS) 100 MG tablet  Efinaconazole 10 % SOLN  furosemide (LASIX) 20 MG tablet  glucagon (GLUCAGEN HYPOKIT) 1 MG SOLR injection  Glucosamine Sulfate 500 MG TABS  HEMP OIL OR EXTRACT OR OTHER CBD CANNABINOID, NOT MEDICAL CANNABIS,  insulin aspart (FIASP FLEXTOUCH) 100 UNIT/ML pen-injector  insulin aspart (NOVOLOG PEN) 100 UNIT/ML pen  insulin glargine (BASAGLAR KWIKPEN) 100 UNIT/ML pen  loratadine (CLARITIN) 10 MG tablet  losartan (COZAAR) 100 MG tablet  methylcellulose (CITRUCEL) 500 MG TABS tablet  naproxen sodium (ALEVE) 220 MG tablet  nitroGLYcerin (NITROSTAT) 0.4 MG sublingual tablet  Omega 3-6-9 Fatty Acids (RA OMEGA 3-6-9) CAPS  primidone (MYSOLINE) 50 MG tablet  sildenafil (REVATIO) 20 MG tablet        Past Medical History:    No past medical history on file.    Past Surgical History:    No past surgical history on file.     Family History:    family history is not on file.    Social History:   reports that he has never smoked. He has never used smokeless tobacco. He reports current alcohol use. He reports that he does not use drugs.  PCP:  "No primary care provider on file.     Physical Exam   Patient Vitals for the past 24 hrs:   BP Temp Temp src Pulse Resp SpO2 Height Weight   03/04/24 0700 -- -- -- -- -- 98 % -- --   03/04/24 0618 123/47 98.2  F (36.8  C) Oral 74 18 97 % 1.651 m (5' 5\") 77.1 kg (170 lb)        Physical Exam  Constitutional: Well developed, uncomfortable, nontox appearance  Head: Atraumatic.   Neck:  no stridor  Eyes: no scleral icterus  Cardiovascular: RRR, 2+ bilat radial pulses  Pulmonary/Chest: nml resp effort  Abdominal: Insulin pump as well as glucose monitor in place without surrounding erythema, just inferior to current site is erythema to left lower quadrant ND, soft, left lower quadrant tenderness with mild induration without fluctuance or crepitance, no rebound or guarding   Ext: Warm, well perfused  Neurological: A&O, symmetric facies, moves ext x4  Skin: Skin is warm and dry.   Psychiatric: Behavior is normal. Thought content normal.   Nursing note and vitals reviewed.    Emergency Department Course   ECG:  No results found for this or any previous visit.    Imaging:  CT Abdomen Pelvis w Contrast   Final Result   IMPRESSION: Left abdominal skin thickening and subcutaneous soft   tissue stranding consistent with reported cellulitis. No abscess or   radiopaque foreign body.      COMPA NAVARRETE MD            SYSTEM ID:  [id number]       Soft Tissue Abdominal Wall or Lower Back    (Results Pending)        Report per radiology unless otherwise specified in report or noted in MDM    Laboratory:  Labs Ordered and Resulted from Time of ED Arrival to Time of ED Departure   COMPREHENSIVE METABOLIC PANEL - Abnormal       Result Value    Sodium 141      Potassium 4.2      Carbon Dioxide (CO2) 21 (*)     Anion Gap 16 (*)     Urea Nitrogen 22.9      Creatinine 1.52 (*)     GFR Estimate 47 (*)     Calcium 9.0      Chloride 104      Glucose 198 (*)     Alkaline Phosphatase 93      AST 78 (*)     ALT 61      Protein Total 6.2 (*) "     Albumin 3.7      Bilirubin Total 0.6     LACTIC ACID WHOLE BLOOD - Abnormal    Lactic Acid 3.4 (*)    LIPASE - Abnormal    Lipase 8 (*)    CRP INFLAMMATION - Abnormal    CRP Inflammation 53.52 (*)    CBC WITH PLATELETS AND DIFFERENTIAL - Abnormal    WBC Count 21.4 (*)     RBC Count 5.21      Hemoglobin 11.4 (*)     Hematocrit 36.0 (*)     MCV 69 (*)     MCH 21.9 (*)     MCHC 31.7      RDW 16.0 (*)     Platelet Count 223      % Neutrophils 90      % Lymphocytes 1      % Monocytes 7      % Eosinophils 0      % Basophils 0      % Immature Granulocytes 2      NRBCs per 100 WBC 0      Absolute Neutrophils 19.2 (*)     Absolute Lymphocytes 0.2 (*)     Absolute Monocytes 1.5 (*)     Absolute Eosinophils 0.0      Absolute Basophils 0.1      Absolute Immature Granulocytes 0.4      Absolute NRBCs 0.0     LACTIC ACID WHOLE BLOOD   BLOOD CULTURE   BLOOD CULTURE        Procedures       Emergency Department Course & Assessments:             Interventions:  Medications   morphine (PF) injection 4 mg (4 mg Intravenous $Given 3/4/24 0816)   ondansetron (ZOFRAN) injection 4 mg (has no administration in time range)   lactated ringers BOLUS 1,500 mL (1,500 mLs Intravenous $New Bag 3/4/24 0816)   vancomycin (VANCOCIN) 1,750 mg in 0.9% NaCl 500 mL intermittent infusion (1,750 mg Intravenous $Given 3/4/24 0835)   morphine (PF) injection 4 mg (4 mg Intravenous $Given 3/4/24 0654)   ondansetron (ZOFRAN) injection 4 mg (4 mg Intravenous $Given 3/4/24 0654)   piperacillin-tazobactam (ZOSYN) 4.5 g vial to attach to  mL bag (0 g Intravenous Stopped 3/4/24 0814)   Saline flush (64 mLs Intravenous $Given 3/4/24 0745)   iopamidol (ISOVUE-370) solution 85 mL (85 mLs Intravenous $Given 3/4/24 0745)        Independent Interpretation (X-rays, CTs, rhythm strip):  See MDM    Consultations/Discussion of Management or Tests:  I discussed the patient and case with the admitting hospitalist service    Social Determinants of Health affecting  "care:  See [facility]    Disposition:  The patient was admitted to the hospital under the care of Dr. Oliveros.     Impression & Plan    CMS Diagnoses: The patient has signs of Severe Sepsis        If one the following conditions is present, a 30 mL/kg bolus is recommended as part of the 6 hour bundle (IBW can be used for BMI >30, or document refusal/contraindication):      1.   Initial hypotension  defined as 2 bps < 90 or map < 65 in the 6hrs before or 3hrs after time zero.     2.  Lactate >4.      The patient has signs of Severe Sepsis as evidenced by:    1. 2 SIRS criteria, AND  2. Suspected infection, AND   3. Organ dysfunction: Lactic Acidosis with value >2.0    Time severe sepsis diagnosis confirmed: 06 as this was the time when Lactate resulted, and the level was > 2.0    3 Hour Severe Sepsis Bundle Completion:    1. Initial Lactic Acid Result:   Recent Labs   Lab Test 24  0652   LACT 3.4*     2. Blood Cultures before Antibiotics: Yes  3. Broad Spectrum Antibiotics Administered:  yes       Anti-infectives (From admission through now)      Start     Dose/Rate Route Frequency Ordered Stop    24 0830  vancomycin (VANCOCIN) 1,750 mg in 0.9% NaCl 500 mL intermittent infusion         1,750 mg  over 2 Hours Intravenous ONCE 24 0814      24 0710  piperacillin-tazobactam (ZOSYN) 4.5 g vial to attach to  mL bag        Note to Pharmacy: For [facility], [facility] and [facility]: For Zosyn-naive patients, use the \"Zosyn initial dose + extended infusion\" order panel.    4.5 g  over 30 Minutes Intravenous ONCE 24 0707 24 0814            4. Is initial hypotension present?     No (IV fluid bolus NOT required). IV Fluid volume administered: 1500 ml, full sepsis bolus not given given history of heart failure and patient is normotensive                    Severe Sepsis reassessment:  Patient mated prior to repeat sepsis reassessment    Medical Decision Makin year old male presenting w/ abdominal " pain, erythema and tenderness    Social determinants affecting patient's health include: Age increasing risk for presentation to the emergency department     I reviewed medical records from endocrinology office visit from 10/5/2023 for history of the patient's diabetes    DDx includes abdominal wall cellulitis, sepsis, severe sepsis, abscess, intra-abdominal pathology such as intra-abdominal abscess, necrotizing fasciitis.  Doubt vascular catastrophe given history and physical exam.  Labs significant for leukocytosis, elevated lactic acid level concerning for severe sepsis.  Imaging sig for findings consistent with abdominal wall cellulitis.  Follow-up ultrasound ordered to evaluate for subtle retained foreign body and pending upon admission.  Patient was given broad-spectrum antibiotics given elevated lactic acid level concern for severe sepsis.  He was subsequently admitted to the hospital service for further evaluation and management.   Patient and wife counseled on all results, disposition and diagnosis.  They are understanding and agreeable to plan. Patient admitted in guarded condition.          Diagnosis:    ICD-10-CM    1. Cellulitis of abdominal wall  L03.311       2. Severe sepsis (H)  A41.9     R65.20            Discharge Medications:  New Prescriptions    No medications on file        3/4/2024   Ernie Diop MD Vaughn, Christopher E, MD  03/04/24 0909

## 2024-03-04 NOTE — CONSULTS
[facility]    Infectious Disease Consultation     Date of Admission:  3/4/2024  Date of Consult (When I saw the patient): 03/04/24    Assessment & Plan   Enrique Long is a 73 year old who was admitted on 3/4/2024.     Impression:  73-year-old male with past medical history of diabetes  CHF  Admitted with abdominal pain that started after switching site of omnipod along with local erythema  Also found to be in ITALIA  Started on Vanco and Zosyn  Blood cultures done they are pending  CT abdomen with left abdominal skin thickening and subcutaneous tissue stranding consistent with cellulitis    Recommendations;   No known history of MRSA patient in ITALIA will recommend stopping vancomycin  Start daptomycin  Get baseline CPK  Continue Zosyn  Follow cultures  Follow cellulitis site       Herminio Duong MD    Reason for Consult   Reason for consult: I was asked to evaluate this patient for abdominal wall cellulitis.    Primary Care Physician   Physician No Ref-Primary    Chief Complaint   Fever    History is obtained from the patient and medical records    History of Present Illness   Enrique Long is a 73 year old male who presents with severe abdominal pain and fever that started after switching site of OmniPod with surrounding local erythema at the site    Past Medical History   I have reviewed this patient's medical history and updated it with pertinent information if needed.   No past medical history on file.    Past Surgical History   I have reviewed this patient's surgical history and updated it with pertinent information if needed.  No past surgical history on file.    Prior to Admission Medications   Prior to Admission Medications   Prescriptions Last Dose Informant Patient Reported? Taking?   Glucosamine Sulfate 500 MG TABS 3/3/2024 at AM Self, Spouse/Significant Other Yes Yes   Sig: Take 1 tablet by mouth daily   HEMP OIL OR EXTRACT OR OTHER CBD CANNABINOID, NOT MEDICAL CANNABIS,  3/3/2024 at AM Self, Spouse/Significant Other Yes Yes   INSULIN PUMP - OUTPATIENT 3/4/2024 Self, Spouse/Significant Other Yes Yes   Sig: Inject 0.6 Units/hr Subcutaneous continuous Date Last Updated: 3/4/24  Omnipod  BASAL RATES and times:  12  AM (midnight) - 12 AM (Midnight): 0.6 units/hour    CARB RATIO and times:  1 unit per 15 grams of carbohydrates  Corection Factor (Sensitivity) and times:  1:32  BLOOD GLUCOSE TARGET:  120 mg/dL  Active Insulin Time: 3 hours  Sensor: Yes (Dexcom G6)   Omega 3-6-9 Fatty Acids (RA OMEGA 3-6-9) CAPS 3/3/2024 at AM Self, Spouse/Significant Other Yes Yes   Sig: Take 1 capsule by mouth daily   amLODIPine (NORVASC) 2.5 MG tablet 3/3/2024 at AM Self, Spouse/Significant Other Yes Yes   Sig: Take 2.5 mg by mouth daily   aspirin 81 MG EC tablet 3/3/2024 at AM Self, Spouse/Significant Other Yes Yes   Sig: Take 81 mg by mouth daily   dexamethasone AF (DECADRON) 0.1 MG/ML solution More than a month at PRN Self, Spouse/Significant Other Yes Yes   Sig: Swish and spit 1.5 mg in mouth 2 times daily as needed (Mouth sores)   furosemide (LASIX) 20 MG tablet Past Month at PRN Self, Spouse/Significant Other Yes Yes   Sig: Take 20 mg by mouth daily as needed   glucagon (GLUCAGEN HYPOKIT) 1 MG SOLR injection More than a month at PRN Self, Spouse/Significant Other Yes Yes   Sig: Inject 1 mg subcutaneously as needed. LW Addl Instr:Indicated for: Hypoglycemia   insulin aspart (FIASP FLEXTOUCH) 100 UNIT/ML pen-injector 3/4/2024 Self, Spouse/Significant Other Yes Yes   Sig: Inject 50 Units Subcutaneous See Admin Instructions To be injected per insulin pump   insulin aspart (NOVOLOG PEN) 100 UNIT/ML pen More than a month at PRN Self, Spouse/Significant Other Yes Yes   Sig: Inject 10-15 Units Subcutaneous 3 times daily as needed for high blood sugar To be used if insulin pump can't be utilized   insulin glargine (BASAGLAR KWIKPEN) 100 UNIT/ML pen More than a month at PRN Self, Spouse/Significant Other Yes  Yes   Sig: Inject 30 Units Subcutaneous daily as needed for other (Pump issues) To be used if unable to utilize insulin pump   loratadine (CLARITIN) 10 MG tablet More than a month at PRN Self, Spouse/Significant Other Yes Yes   Sig: Take 10 mg by mouth daily as needed   losartan-hydrochlorothiazide (HYZAAR) 100-25 MG tablet 3/3/2024 at AM Self, Spouse/Significant Other Yes Yes   Sig: Take 1 tablet by mouth daily   methylcellulose (CITRUCEL) 500 MG TABS tablet Past Month at PRN Self, Spouse/Significant Other Yes Yes   Sig: Take 1 capsule by mouth 2 times daily as needed   multivitamin, therapeutic (THERA-VIT) TABS tablet 3/3/2024 at AM Self, Spouse/Significant Other Yes Yes   Sig: Take 1 tablet by mouth daily   naproxen sodium (ALEVE) 220 MG tablet 3/3/2024 at PM Self, Spouse/Significant Other Yes Yes   Sig: Take 1 tablet by mouth 3 times daily as needed for moderate pain Patient takes AM, Noon, and 9 PM   nitroGLYcerin (NITROSTAT) 0.4 MG sublingual tablet More than a month at PRN Self, Spouse/Significant Other Yes Yes   Sig: Place 0.4 mg under the tongue every 5 minutes as needed   primidone (MYSOLINE) 50 MG tablet 3/3/2024 at HS Self, Spouse/Significant Other No Yes   Sig: TAKE 2 TABLETS BY MOUTH TWICE DAILY, SEE SCHEDULE FROM CLINIC   Patient taking differently: Take 100 mg by mouth at bedtime Take 2 tablets by mouth once daily   propranolol ER (INDERAL LA) 60 MG 24 hr capsule 3/3/2024 at AM Self, Spouse/Significant Other Yes Yes   Sig: Take 60 mg by mouth daily   rosuvastatin (CRESTOR) 10 MG tablet 3/3/2024 at AM Self, Spouse/Significant Other Yes Yes   Sig: Take 10 mg by mouth daily   sildenafil (REVATIO) 20 MG tablet Past Month at PRN Self, Spouse/Significant Other Yes Yes   Sig: USE 5 TABLETS ONCE DAILY AS NEEDED FOR ERECTILE DYSFUNCTION      Facility-Administered Medications: None     Allergies   Allergies   Allergen Reactions    Chocolate Flavor      Mouth pain    Codeine GI Disturbance and Other (See  "Comments)     PN: LW Reaction: gi upset      Lisinopril Cough       Immunization History   Immunization History   Administered Date(s) Administered    COVID-19 Bivalent 18+ (Moderna) 10/03/2022    COVID-19 Monovalent 12+ (Pfizer 2022) 05/06/2022    COVID-19 Monovalent 18+ (Moderna) 03/01/2021, 04/06/2021, 12/12/2021       Social History   I have reviewed this patient's social history and updated it with pertinent information if needed. Enrique Long  reports that he has never smoked. He has never used smokeless tobacco. He reports current alcohol use. He reports that he does not use drugs.    Family History   I have reviewed this patient's family history and updated it with pertinent information if needed.   No family history on file.    Review of Systems   The 10 point Review of Systems is negative    Physical Exam   Temp: (!) 100.8  F (38.2  C) Temp src: Oral BP: 138/55 Pulse: 89   Resp: 18 SpO2: 90 % O2 Device: None (Room air)    Vital Signs with Ranges  Temp:  [98.2  F (36.8  C)-100.8  F (38.2  C)] 100.8  F (38.2  C)  Pulse:  [71-89] 89  Resp:  [18] 18  BP: ()/(47-68) 138/55  SpO2:  [90 %-98 %] 90 %  170 lbs 0 oz  Body mass index is 28.29 kg/m .    GENERAL APPEARANCE:  awake  EYES: Eyes grossly normal to inspection  NECK: no adenopathy  RESP: lungs clear   CV: regular rates and rhythm  LYMPHATICS: normal ant/post cervical and supraclavicular nodes  ABDOMEN: left abdomen has redness and slight induration   MS: extremities normal  SKIN: no suspicious lesions or rashes        Data   All laboratory and imaging data in the past 24 hours reviewed  No results for input(s): \"CULT\" in the last 168 hours.  No lab results found.    Invalid input(s): \"UC\"       All cultures:  No results for input(s): \"CULTURE\" in the last 168 hours.   Blood culture:  No results found for this or any previous visit.   Urine culture:  No results found for this or any previous visit.          "

## 2024-03-04 NOTE — PHARMACY-ADMISSION MEDICATION HISTORY
Pharmacist Admission Medication History    Admission medication history is complete. The information provided in this note is only as accurate as the sources available at the time of the update.    Information Source(s): Patient, Family member, and CareEverywhere/SureScripts via in-person    Pertinent Information: Patient using an Omnipod G6 for blood glucose management. Site was just changed yesterday. Current pump settings listed in PTA med list entry below.    Changes made to PTA medication list:  Added: Insulin pump, amlodipine, losartan/hydrochlorothiazide, propranolol, rosuvastatin, multivitamin  Deleted: atorvastatin, Botox, clobetasol gel, doxycycline, efinaconazole, losartan  Changed: Naproxen 220 mg 2 tablets BID -> 1 tablet TID, Fiasp 10-15 units TID -> 50 units daily via pump, Primidone 2 tablets BID -> 2 tablets @ HS    Allergies reviewed with patient and updates made in EHR: yes    Medication History Completed By: Thor Crowe [facility] 3/4/2024 9:29 AM    PTA Med List   Medication Sig Last Dose    amLODIPine (NORVASC) 2.5 MG tablet Take 2.5 mg by mouth daily 3/3/2024 at AM    aspirin 81 MG EC tablet Take 81 mg by mouth daily 3/3/2024 at AM    dexamethasone AF (DECADRON) 0.1 MG/ML solution Swish and spit 1.5 mg in mouth 2 times daily as needed (Mouth sores) More than a month at PRN    furosemide (LASIX) 20 MG tablet Take 20 mg by mouth daily as needed Past Month at PRN    glucagon (GLUCAGEN HYPOKIT) 1 MG SOLR injection Inject 1 mg subcutaneously as needed. LW Addl Instr:Indicated for: Hypoglycemia More than a month at PRN    Glucosamine Sulfate 500 MG TABS Take 1 tablet by mouth daily 3/3/2024 at AM    HEMP OIL OR EXTRACT OR OTHER CBD CANNABINOID, NOT MEDICAL CANNABIS,  3/3/2024 at AM    insulin aspart (FIASP FLEXTOUCH) 100 UNIT/ML pen-injector Inject 50 Units Subcutaneous See Admin Instructions To be injected per insulin pump 3/4/2024    insulin aspart (NOVOLOG PEN) 100 UNIT/ML pen Inject 10-15 Units  Subcutaneous 3 times daily as needed for high blood sugar To be used if insulin pump can't be utilized More than a month at PRN    insulin glargine (BASAGLAR KWIKPEN) 100 UNIT/ML pen Inject 30 Units Subcutaneous daily as needed for other (Pump issues) To be used if unable to utilize insulin pump More than a month at PRN    INSULIN PUMP - OUTPATIENT Inject 0.6 Units/hr Subcutaneous continuous Date Last Updated: 3/4/24  Omnipod  BASAL RATES and times:  12  AM (midnight) - 12 AM (Midnight): 0.6 units/hour    CARB RATIO and times:  1 unit per 15 grams of carbohydrates  Corection Factor (Sensitivity) and times:  1:32  BLOOD GLUCOSE TARGET:  120 mg/dL  Active Insulin Time: 3 hours  Sensor: Yes (Dexcom G6) 3/4/2024    loratadine (CLARITIN) 10 MG tablet Take 10 mg by mouth daily as needed More than a month at PRN    losartan-hydrochlorothiazide (HYZAAR) 100-25 MG tablet Take 1 tablet by mouth daily 3/3/2024 at AM    methylcellulose (CITRUCEL) 500 MG TABS tablet Take 1 capsule by mouth 2 times daily as needed Past Month at PRN    multivitamin, therapeutic (THERA-VIT) TABS tablet Take 1 tablet by mouth daily 3/3/2024 at AM    naproxen sodium (ALEVE) 220 MG tablet Take 1 tablet by mouth 3 times daily as needed for moderate pain Patient takes AM, Noon, and 9 PM 3/3/2024 at PM    nitroGLYcerin (NITROSTAT) 0.4 MG sublingual tablet Place 0.4 mg under the tongue every 5 minutes as needed More than a month at PRN    Omega 3-6-9 Fatty Acids (RA OMEGA 3-6-9) CAPS Take 1 capsule by mouth daily 3/3/2024 at AM    primidone (MYSOLINE) 50 MG tablet TAKE 2 TABLETS BY MOUTH TWICE DAILY, SEE SCHEDULE FROM CLINIC (Patient taking differently: Take 100 mg by mouth at bedtime Take 2 tablets by mouth once daily) 3/3/2024 at HS    propranolol ER (INDERAL LA) 60 MG 24 hr capsule Take 60 mg by mouth daily 3/3/2024 at AM    rosuvastatin (CRESTOR) 10 MG tablet Take 10 mg by mouth daily 3/3/2024 at AM    sildenafil (REVATIO) 20 MG tablet USE 5 TABLETS  ONCE DAILY AS NEEDED FOR ERECTILE DYSFUNCTION Past Month at PRN

## 2024-03-04 NOTE — ED NOTES
[facility]  ED Nurse Handoff Report    ED Chief complaint: Abdominal Pain      ED Diagnosis:   Final diagnoses:   Cellulitis of abdominal wall   Severe sepsis (H)       Code Status: to be addressed by admitting    Allergies:   Allergies   Allergen Reactions    Chocolate Flavor      Mouth pain    Codeine GI Disturbance and Other (See Comments)     PN: LW Reaction: gi upset      Lisinopril Cough       Patient Story: pt T1DM. Replaced omnipod yesterday morning and at the injection site pt developed pain. There is also redness and firmness noted. Pt was concerned part of the cannula was still in skin. Pt alert and oriented.   Focused Assessment:  RESP: WNL  Cardiac:WNL  Neuro:WNL  Skin: redness and swelling noted on lt lower abd.   Abnormal Labs Resulted from Time of ED Arrival to Time of ED Departure   COMPREHENSIVE METABOLIC PANEL - Abnormal       Result Value    Sodium 141      Potassium 4.2      Carbon Dioxide (CO2) 21 (*)     Anion Gap 16 (*)     Urea Nitrogen 22.9      Creatinine 1.52 (*)     GFR Estimate 47 (*)     Calcium 9.0      Chloride 104      Glucose 198 (*)     Alkaline Phosphatase 93      AST 78 (*)     ALT 61      Protein Total 6.2 (*)     Albumin 3.7      Bilirubin Total 0.6     LACTIC ACID WHOLE BLOOD - Abnormal    Lactic Acid 3.4 (*)    LIPASE - Abnormal    Lipase 8 (*)    CRP INFLAMMATION - Abnormal    CRP Inflammation 53.52 (*)    CBC WITH PLATELETS AND DIFFERENTIAL - Abnormal    WBC Count 21.4 (*)     RBC Count 5.21      Hemoglobin 11.4 (*)     Hematocrit 36.0 (*)     MCV 69 (*)     MCH 21.9 (*)     MCHC 31.7      RDW 16.0 (*)     Platelet Count 223      % Neutrophils 90      % Lymphocytes 1      % Monocytes 7      % Eosinophils 0      % Basophils 0      % Immature Granulocytes 2      NRBCs per 100 WBC 0      Absolute Neutrophils 19.2 (*)     Absolute Lymphocytes 0.2 (*)     Absolute Monocytes 1.5 (*)     Absolute Eosinophils 0.0      Absolute Basophils 0.1      Absolute Immature  "Granulocytes 0.4      Absolute NRBCs 0.0        CT Abdomen Pelvis w Contrast   Final Result   IMPRESSION: Left abdominal skin thickening and subcutaneous soft   tissue stranding consistent with reported cellulitis. No abscess or   radiopaque foreign body.      COMPA NAVARRETE MD            SYSTEM ID:  [id number]      US Soft Tissue Abdominal Wall or Lower Back    (Results Pending)        Treatments and/or interventions provided: IVF and antibiotics  Patient's response to treatments and/or interventions:     To be done/followed up on inpatient unit:  admission orders    Does this patient have any cognitive concerns?:  none noted    Activity level - Baseline/Home:  Independent  Activity Level - Current:   Stand with Assist    Patient's Preferred language: English   Needed?: No    Isolation: None  Infection: Not Applicable  Patient tested for COVID 19 prior to admission: NO  Bariatric?: No    Vital Signs:   Vitals:    03/04/24 0618 03/04/24 0700   BP: 123/47    Pulse: 74    Resp: 18    Temp: 98.2  F (36.8  C)    TempSrc: Oral    SpO2: 97% 98%   Weight: 77.1 kg (170 lb)    Height: 1.651 m (5' 5\")        Cardiac Rhythm:     Was the PSS-3 completed:   Yes  What interventions are required if any?               Family Comments: wife at bedside  OBS brochure/video discussed/provided to patient/family: N/A              Name of person given brochure if not patient:              Relationship to patient:     For the majority of the shift this patient's behavior was Green.   Behavioral interventions performed were .    ED NURSE PHONE NUMBER: *[phone]          "

## 2024-03-04 NOTE — PROGRESS NOTES
RECEIVING UNIT ED HANDOFF REVIEW    ED Nurse Handoff Report was reviewed by: Tyra Wang RN on March 4, 2024 at 12:54 PM

## 2024-03-05 ENCOUNTER — APPOINTMENT (OUTPATIENT)
Dept: GENERAL RADIOLOGY | Facility: CLINIC | Age: 74
DRG: 871 | End: 2024-03-05
Attending: HOSPITALIST
Payer: MEDICARE

## 2024-03-05 LAB
ALBUMIN UR-MCNC: 50 MG/DL
AMORPH CRY #/AREA URNS HPF: ABNORMAL /HPF
ANION GAP SERPL CALCULATED.3IONS-SCNC: 13 MMOL/L (ref 7–15)
ANION GAP SERPL CALCULATED.3IONS-SCNC: 14 MMOL/L (ref 7–15)
APPEARANCE UR: ABNORMAL
BACTERIA #/AREA URNS HPF: ABNORMAL /HPF
BILIRUB UR QL STRIP: NEGATIVE
BUN SERPL-MCNC: 44.6 MG/DL (ref 8–23)
BUN SERPL-MCNC: 49.8 MG/DL (ref 8–23)
CALCIUM SERPL-MCNC: 7.9 MG/DL (ref 8.8–10.2)
CALCIUM SERPL-MCNC: 8.1 MG/DL (ref 8.8–10.2)
CHLORIDE SERPL-SCNC: 105 MMOL/L (ref 98–107)
CHLORIDE SERPL-SCNC: 105 MMOL/L (ref 98–107)
CK SERPL-CCNC: 547 U/L (ref 39–308)
COLOR UR AUTO: YELLOW
CREAT SERPL-MCNC: 2.66 MG/DL (ref 0.67–1.17)
CREAT SERPL-MCNC: 2.69 MG/DL (ref 0.67–1.17)
CREAT UR-MCNC: 217.3 MG/DL
DEPRECATED HCO3 PLAS-SCNC: 20 MMOL/L (ref 22–29)
DEPRECATED HCO3 PLAS-SCNC: 20 MMOL/L (ref 22–29)
EGFRCR SERPLBLD CKD-EPI 2021: 24 ML/MIN/1.73M2
EGFRCR SERPLBLD CKD-EPI 2021: 24 ML/MIN/1.73M2
ERYTHROCYTE [DISTWIDTH] IN BLOOD BY AUTOMATED COUNT: 17.2 % (ref 10–15)
FRACT EXCRET NA UR+SERPL-RTO: NORMAL %
GLUCOSE BLDC GLUCOMTR-MCNC: 119 MG/DL (ref 70–99)
GLUCOSE BLDC GLUCOMTR-MCNC: 131 MG/DL (ref 70–99)
GLUCOSE BLDC GLUCOMTR-MCNC: 188 MG/DL (ref 70–99)
GLUCOSE SERPL-MCNC: 163 MG/DL (ref 70–99)
GLUCOSE SERPL-MCNC: 172 MG/DL (ref 70–99)
GLUCOSE UR STRIP-MCNC: NEGATIVE MG/DL
HCT VFR BLD AUTO: 33.4 % (ref 40–53)
HGB BLD-MCNC: 10.9 G/DL (ref 13.3–17.7)
HGB UR QL STRIP: ABNORMAL
HYALINE CASTS: 6 /LPF
KETONES UR STRIP-MCNC: ABNORMAL MG/DL
LACTATE SERPL-SCNC: 1.9 MMOL/L (ref 0.7–2)
LEUKOCYTE ESTERASE UR QL STRIP: ABNORMAL
MCH RBC QN AUTO: 22.1 PG (ref 26.5–33)
MCHC RBC AUTO-ENTMCNC: 32.6 G/DL (ref 31.5–36.5)
MCV RBC AUTO: 68 FL (ref 78–100)
MUCOUS THREADS #/AREA URNS LPF: PRESENT /LPF
NITRATE UR QL: NEGATIVE
NT-PROBNP SERPL-MCNC: 5104 PG/ML (ref 0–900)
PH UR STRIP: 5.5 [PH] (ref 5–7)
PLATELET # BLD AUTO: 193 10E3/UL (ref 150–450)
POTASSIUM SERPL-SCNC: 4.3 MMOL/L (ref 3.4–5.3)
POTASSIUM SERPL-SCNC: 4.4 MMOL/L (ref 3.4–5.3)
RBC # BLD AUTO: 4.93 10E6/UL (ref 4.4–5.9)
RBC URINE: 12 /HPF
SODIUM SERPL-SCNC: 138 MMOL/L (ref 135–145)
SODIUM SERPL-SCNC: 139 MMOL/L (ref 135–145)
SODIUM UR-SCNC: <20 MMOL/L
SP GR UR STRIP: 1.03 (ref 1–1.03)
UROBILINOGEN UR STRIP-MCNC: NORMAL MG/DL
WBC # BLD AUTO: 22.6 10E3/UL (ref 4–11)
WBC URINE: 19 /HPF

## 2024-03-05 PROCEDURE — 81001 URINALYSIS AUTO W/SCOPE: CPT | Performed by: HOSPITALIST

## 2024-03-05 PROCEDURE — 80048 BASIC METABOLIC PNL TOTAL CA: CPT | Performed by: HOSPITALIST

## 2024-03-05 PROCEDURE — 83605 ASSAY OF LACTIC ACID: CPT | Performed by: HOSPITALIST

## 2024-03-05 PROCEDURE — 82570 ASSAY OF URINE CREATININE: CPT | Performed by: HOSPITALIST

## 2024-03-05 PROCEDURE — 36415 COLL VENOUS BLD VENIPUNCTURE: CPT | Performed by: HOSPITALIST

## 2024-03-05 PROCEDURE — 258N000003 HC RX IP 258 OP 636: Performed by: HOSPITALIST

## 2024-03-05 PROCEDURE — 250N000013 HC RX MED GY IP 250 OP 250 PS 637: Performed by: HOSPITALIST

## 2024-03-05 PROCEDURE — 87086 URINE CULTURE/COLONY COUNT: CPT | Performed by: HOSPITALIST

## 2024-03-05 PROCEDURE — 99222 1ST HOSP IP/OBS MODERATE 55: CPT | Performed by: INTERNAL MEDICINE

## 2024-03-05 PROCEDURE — 82550 ASSAY OF CK (CPK): CPT | Performed by: HOSPITALIST

## 2024-03-05 PROCEDURE — 250N000012 HC RX MED GY IP 250 OP 636 PS 637: Performed by: HOSPITALIST

## 2024-03-05 PROCEDURE — 250N000011 HC RX IP 250 OP 636: Performed by: HOSPITALIST

## 2024-03-05 PROCEDURE — 120N000001 HC R&B MED SURG/OB

## 2024-03-05 PROCEDURE — 71046 X-RAY EXAM CHEST 2 VIEWS: CPT

## 2024-03-05 PROCEDURE — 85027 COMPLETE CBC AUTOMATED: CPT | Performed by: HOSPITALIST

## 2024-03-05 PROCEDURE — 5A09357 ASSISTANCE WITH RESPIRATORY VENTILATION, LESS THAN 24 CONSECUTIVE HOURS, CONTINUOUS POSITIVE AIRWAY PRESSURE: ICD-10-PCS | Performed by: HOSPITALIST

## 2024-03-05 PROCEDURE — 99233 SBSQ HOSP IP/OBS HIGH 50: CPT | Performed by: HOSPITALIST

## 2024-03-05 PROCEDURE — 99232 SBSQ HOSP IP/OBS MODERATE 35: CPT | Performed by: INTERNAL MEDICINE

## 2024-03-05 PROCEDURE — 83880 ASSAY OF NATRIURETIC PEPTIDE: CPT | Performed by: HOSPITALIST

## 2024-03-05 PROCEDURE — 250N000011 HC RX IP 250 OP 636: Performed by: INTERNAL MEDICINE

## 2024-03-05 RX ORDER — CLINDAMYCIN PHOSPHATE 900 MG/50ML
900 INJECTION, SOLUTION INTRAVENOUS EVERY 8 HOURS
Status: DISCONTINUED | OUTPATIENT
Start: 2024-03-05 | End: 2024-03-10

## 2024-03-05 RX ORDER — CEFTRIAXONE 2 G/1
2 INJECTION, POWDER, FOR SOLUTION INTRAMUSCULAR; INTRAVENOUS EVERY 24 HOURS
Status: DISCONTINUED | OUTPATIENT
Start: 2024-03-05 | End: 2024-03-10

## 2024-03-05 RX ORDER — HEPARIN SODIUM 5000 [USP'U]/.5ML
5000 INJECTION, SOLUTION INTRAVENOUS; SUBCUTANEOUS EVERY 12 HOURS
Status: DISCONTINUED | OUTPATIENT
Start: 2024-03-05 | End: 2024-03-10 | Stop reason: HOSPADM

## 2024-03-05 RX ORDER — LANOLIN ALCOHOL/MO/W.PET/CERES
3 CREAM (GRAM) TOPICAL AT BEDTIME
Status: DISCONTINUED | OUTPATIENT
Start: 2024-03-05 | End: 2024-03-10 | Stop reason: HOSPADM

## 2024-03-05 RX ORDER — LANOLIN ALCOHOL/MO/W.PET/CERES
3 CREAM (GRAM) TOPICAL
Status: DISCONTINUED | OUTPATIENT
Start: 2024-03-05 | End: 2024-03-10 | Stop reason: HOSPADM

## 2024-03-05 RX ORDER — INSULIN PMP CART,AUT,G6/7,CNTR
EACH SUBCUTANEOUS
COMMUNITY

## 2024-03-05 RX ORDER — SODIUM CHLORIDE 9 MG/ML
INJECTION, SOLUTION INTRAVENOUS CONTINUOUS
Status: DISCONTINUED | OUTPATIENT
Start: 2024-03-05 | End: 2024-03-05

## 2024-03-05 RX ADMIN — ASPIRIN 81 MG: 81 TABLET, COATED ORAL at 08:24

## 2024-03-05 RX ADMIN — CEFTRIAXONE SODIUM 2 G: 2 INJECTION, POWDER, FOR SOLUTION INTRAMUSCULAR; INTRAVENOUS at 14:26

## 2024-03-05 RX ADMIN — PIPERACILLIN AND TAZOBACTAM 3.38 G: 3; .375 INJECTION, POWDER, FOR SOLUTION INTRAVENOUS at 01:36

## 2024-03-05 RX ADMIN — PRIMIDONE 100 MG: 50 TABLET ORAL at 21:57

## 2024-03-05 RX ADMIN — SODIUM CHLORIDE 500 ML: 9 INJECTION, SOLUTION INTRAVENOUS at 11:08

## 2024-03-05 RX ADMIN — INSULIN ASPART: 100 INJECTION, SOLUTION INTRAVENOUS; SUBCUTANEOUS at 18:27

## 2024-03-05 RX ADMIN — MELATONIN TAB 3 MG 3 MG: 3 TAB at 21:57

## 2024-03-05 RX ADMIN — HEPARIN SODIUM 5000 UNITS: 5000 INJECTION, SOLUTION INTRAVENOUS; SUBCUTANEOUS at 11:07

## 2024-03-05 RX ADMIN — MULTIVITAMIN TABLET 1 TABLET: TABLET at 08:24

## 2024-03-05 RX ADMIN — CLINDAMYCIN PHOSPHATE 900 MG: 900 INJECTION, SOLUTION INTRAVENOUS at 19:06

## 2024-03-05 RX ADMIN — ACETAMINOPHEN 650 MG: 325 TABLET, FILM COATED ORAL at 14:43

## 2024-03-05 RX ADMIN — CLINDAMYCIN PHOSPHATE 900 MG: 900 INJECTION, SOLUTION INTRAVENOUS at 13:23

## 2024-03-05 RX ADMIN — ROSUVASTATIN CALCIUM 10 MG: 10 TABLET, FILM COATED ORAL at 08:24

## 2024-03-05 RX ADMIN — HEPARIN SODIUM 5000 UNITS: 5000 INJECTION, SOLUTION INTRAVENOUS; SUBCUTANEOUS at 22:58

## 2024-03-05 RX ADMIN — PIPERACILLIN AND TAZOBACTAM 3.38 G: 3; .375 INJECTION, POWDER, FOR SOLUTION INTRAVENOUS at 07:44

## 2024-03-05 ASSESSMENT — ACTIVITIES OF DAILY LIVING (ADL)
ADLS_ACUITY_SCORE: 28
ADLS_ACUITY_SCORE: 28
ADLS_ACUITY_SCORE: 27
ADLS_ACUITY_SCORE: 28
ADLS_ACUITY_SCORE: 27
ADLS_ACUITY_SCORE: 28
ADLS_ACUITY_SCORE: 27
ADLS_ACUITY_SCORE: 28
ADLS_ACUITY_SCORE: 27
ADLS_ACUITY_SCORE: 28

## 2024-03-05 NOTE — CONSULTS
Nephrology Initial Consult  March 5, 2024      Enrique Long MRN:[id number] YOB: 1950  Date of Admission:3/4/2024  Primary care provider: No Ref-Primary, Physician  Requesting physician: Zach Oliveros MD    ASSESSMENT AND RECOMMENDATIONS:   1 acute renal failure-last known creatinine of 1 in July 2023.  Microalbuminuria around 200 mg/gram in June 2023 (type I DM for more than 65 years)  -Prerenal ITALIA with poor oral intake, losartan/hydrochlorothiazide, regular use of high-dose NSAIDs and SIRS.  Further worsened by IV contrast    CT abdomen shows normal-looking kidneys  Urine sodium of less than 20  Urinalysis shows pyuria, microscopic hematuria, 1+ protein, hyaline casts  Urine sodium less than 20, likely with contrast nephropathy    2 abdominal wall cellulitis-on ceftriaxone, clindamycin    3 type 1 diabetes mellitus-on insulin.  Denies history of retinopathy.  Has albuminuria    4 essential hypertension-continue amlodipine.  Hold losartan/hydrochlorothiazide.    Advised against regular use of NSAIDs  Avoid further IV contrast use  Strict I's/O  Daily renal panel  Continue supportive management  Agree with maintenance IV fluid    Thank you for the consult. Will continue to follow along with you .    Recommendations were communicated to primary team  in person       April Chapman MD  [facility] - Nephrology   [phone]        REASON FOR CONSULT: Acute renal failure    HISTORY OF PRESENT ILLNESS:  Enrique Long is a 73 year old male with history of type 1 diabetes mellitus with neuropathy, albuminuria, normal kidney function at baseline who presented with abdominal wall cellulitis after changing the OmniPod site.  Evaluation showed leukocytosis, elevated lactate, elevated CRP, creatinine of 1.5.  Received a dose of vancomycin and Zosyn and now switched to clindamycin and ceftriaxone    We are consulted for severe ITALIA  Last known creatinine of 1 in July 2023.  Creatinine 1.5  on presentation.  Increased today to 2.6.  He was on losartan/hydrochlorothiazide PTA.  He also takes Aleve 3 times a day for last several years for general aches and pains.  Reports poor oral intake over the last few days.  Nausea plus.  No diarrhea  Received IV contrast yesterday with CT scan.    CT abdomen shows normal-looking kidneys  Urine sodium of less than 20  Urinalysis shows pyuria, microscopic hematuria, 1+ protein, hyaline casts    On eval, comfortable.  Denies any complaints at the moment.    PAST MEDICAL HISTORY:  Reviewed with patient on 03/05/2024  and is as listed in HPI.       MEDICATIONS:  PTA Meds  Prior to Admission medications    Medication Sig Last Dose Taking? Auth Provider Long Term End Date   amLODIPine (NORVASC) 2.5 MG tablet Take 2.5 mg by mouth daily 3/3/2024 at AM Yes Unknown, Entered By History Yes    aspirin 81 MG EC tablet Take 81 mg by mouth daily 3/3/2024 at AM Yes Reported, Patient     dexamethasone AF (DECADRON) 0.1 MG/ML solution Swish and spit 1.5 mg in mouth 2 times daily as needed (Mouth sores) More than a month at PRN Yes Reported, Patient Yes    furosemide (LASIX) 20 MG tablet Take 20 mg by mouth daily as needed Past Month at PRN Yes Reported, Patient Yes    glucagon (GLUCAGEN HYPOKIT) 1 MG SOLR injection Inject 1 mg subcutaneously as needed. LW Addl Instr:Indicated for: Hypoglycemia More than a month at PRN Yes Reported, Patient Yes    Glucosamine Sulfate 500 MG TABS Take 1 tablet by mouth daily 3/3/2024 at AM Yes Reported, Patient     HEMP OIL OR EXTRACT OR OTHER CBD CANNABINOID, NOT MEDICAL CANNABIS,  3/3/2024 at AM Yes Reported, Patient     insulin aspart (FIASP FLEXTOUCH) 100 UNIT/ML pen-injector Inject 50 Units Subcutaneous See Admin Instructions To be injected per insulin pump 3/4/2024 Yes Reported, Patient     insulin aspart (NOVOLOG PEN) 100 UNIT/ML pen Inject 10-15 Units Subcutaneous 3 times daily as needed for high blood sugar To be used if insulin pump can't be  utilized More than a month at PRN Yes Reported, Patient Yes    insulin glargine (BASAGLAR KWIKPEN) 100 UNIT/ML pen Inject 30 Units Subcutaneous daily as needed for other (Pump issues) To be used if unable to utilize insulin pump More than a month at PRN Yes Reported, Patient Yes    INSULIN PUMP - OUTPATIENT Inject 0.6 Units/hr Subcutaneous continuous Date Last Updated: 3/4/24  Omnipod  BASAL RATES and times:  12  AM (midnight) - 12 AM (Midnight): 0.6 units/hour    CARB RATIO and times:  1 unit per 15 grams of carbohydrates  Corection Factor (Sensitivity) and times:  1:32  BLOOD GLUCOSE TARGET:  120 mg/dL  Active Insulin Time: 3 hours  Sensor: Yes (Dexcom G6) 3/4/2024 Yes Unknown, Entered By History     loratadine (CLARITIN) 10 MG tablet Take 10 mg by mouth daily as needed More than a month at PRN Yes Reported, Patient     losartan-hydrochlorothiazide (HYZAAR) 100-25 MG tablet Take 1 tablet by mouth daily 3/3/2024 at AM Yes Unknown, Entered By History Yes    methylcellulose (CITRUCEL) 500 MG TABS tablet Take 1 capsule by mouth 2 times daily as needed Past Month at PRN Yes Reported, Patient     multivitamin, therapeutic (THERA-VIT) TABS tablet Take 1 tablet by mouth daily 3/3/2024 at AM Yes Unknown, Entered By History     naproxen sodium (ALEVE) 220 MG tablet Take 1 tablet by mouth 3 times daily as needed for moderate pain Patient takes AM, Noon, and 9 PM 3/3/2024 at PM Yes Reported, Patient     nitroGLYcerin (NITROSTAT) 0.4 MG sublingual tablet Place 0.4 mg under the tongue every 5 minutes as needed More than a month at PRN Yes Reported, Patient Yes    Omega 3-6-9 Fatty Acids (RA OMEGA 3-6-9) CAPS Take 1 capsule by mouth daily 3/3/2024 at AM Yes Reported, Patient     primidone (MYSOLINE) 50 MG tablet TAKE 2 TABLETS BY MOUTH TWICE DAILY, SEE SCHEDULE FROM CLINIC  Patient taking differently: Take 100 mg by mouth at bedtime Take 2 tablets by mouth once daily 3/3/2024 at HS Yes Won Gautam MD Yes    propranolol ER  "(INDERAL LA) 60 MG 24 hr capsule Take 60 mg by mouth daily 3/3/2024 at AM Yes Unknown, Entered By History No    rosuvastatin (CRESTOR) 10 MG tablet Take 10 mg by mouth daily 3/3/2024 at AM Yes Unknown, Entered By History Yes    sildenafil (REVATIO) 20 MG tablet Take 100 mg by mouth daily as needed (erectile dysfunction) Past Month at PRN Yes Reported, Patient Yes       Current Meds   amLODIPine  2.5 mg Oral Daily    aspirin  81 mg Oral Daily    cefTRIAXone  2 g Intravenous Q24H    clindamycin  900 mg Intravenous Q8H    heparin ANTICOAGULANT  5,000 Units Subcutaneous Q12H    insulin aspart   Device See Admin Instructions    insulin   Subcutaneous TID AC    insulin   Subcutaneous TID AC    multivitamin, therapeutic  1 tablet Oral Daily    primidone  100 mg Oral At Bedtime    propranolol ER  60 mg Oral Daily    rosuvastatin  10 mg Oral Daily    sodium chloride (PF)  3 mL Intracatheter Q8H     Infusion Meds   insulin basal rate for inpatient ambulatory pump 0.6 Units/hr (24 1336)    sodium chloride         ALLERGIES:    Allergies   Allergen Reactions    Chocolate Flavor      Mouth pain    Codeine GI Disturbance and Other (See Comments)     PN: LW Reaction: gi upset      Lisinopril Cough       REVIEW OF SYSTEMS:  A comprehensive of systems was negative except as noted above.    SOCIAL HISTORY:   Reviewed with patient on 2024     FAMILY MEDICAL HISTORY:   No family history on file.  Reviewed with patient on 2024     PHYSICAL EXAM:   Temp  Av.5  F (37.5  C)  Min: 98.2  F (36.8  C)  Max: 100.8  F (38.2  C)      Pulse  Av.6  Min: 71  Max: 89 Resp  Av  Min: 16  Max: 18  SpO2  Av.2 %  Min: 90 %  Max: 98 %       /47 (BP Location: Right arm, Patient Position: Supine, Cuff Size: Adult Regular)   Pulse 86   Temp 98.9  F (37.2  C) (Oral)   Resp 18   Ht 1.651 m (5' 5\")   Wt 77.1 kg (170 lb)   SpO2 96%   BMI 28.29 kg/m        Admit Weight: 77.1 kg (170 lb)     GENERAL APPEARANCE: no " distress,  awake  EYES: no scleral icterus, pupils equal  HENT: NC/AT,  mouth  without ulcers or lesions  Lymphatics: no cervical or supraclavicular LAD  Endo: no moon facies, no goiter  Pulmonary: lungs clear to auscultation with equal breath sounds bilaterally, no clubbing  CV: regular rhythm, normal rate, no rub   - JVP -   - Edema-  GI: soft, abdominal wall erythema  MS: no evidence of inflammation in joints  : no fuentes  SKIN: no rash, warm, dry, no cyanosis  NEURO: face symmetric, no asterixis     LABS:   CMP  Recent Labs   Lab 03/05/24  1146 03/05/24  0829 03/05/24  0755 03/04/24 2028 03/04/24  1354 03/04/24  0652   NA  --  139  --   --   --  141   POTASSIUM  --  4.4  --   --   --  4.2   CHLORIDE  --  105  --   --   --  104   CO2  --  20*  --   --   --  21*   ANIONGAP  --  14  --   --   --  16*   * 163* 119* 92   < > 198*   BUN  --  44.6*  --   --   --  22.9   CR  --  2.66*  --   --   --  1.52*   GFRESTIMATED  --  24*  --   --   --  47*   PILY  --  8.1*  --   --   --  9.0   PROTTOTAL  --   --   --   --   --  6.2*   ALBUMIN  --   --   --   --   --  3.7   BILITOTAL  --   --   --   --   --  0.6   ALKPHOS  --   --   --   --   --  93   AST  --   --   --   --   --  78*   ALT  --   --   --   --   --  61    < > = values in this interval not displayed.     CBC  Recent Labs   Lab 03/05/24  0829 03/04/24  0652   HGB 10.9* 11.4*   WBC 22.6* 21.4*   RBC 4.93 5.21   HCT 33.4* 36.0*   MCV 68* 69*   MCH 22.1* 21.9*   MCHC 32.6 31.7   RDW 17.2* 16.0*    223     INRNo lab results found in last 7 days.  ABGNo lab results found in last 7 days.   URINE STUDIES  Recent Labs   Lab Test 03/05/24  1117   COLOR Yellow   APPEARANCE Slightly Cloudy*   URINEGLC Negative   URINEBILI Negative   URINEKETONE Trace*   SG 1.030   UBLD Small*   URINEPH 5.5   PROTEIN 50*   NITRITE Negative   LEUKEST Trace*   RBCU 12*   WBCU 19*       IMAGING:  Personally reviewed the images and findings are as listed in HPI     April Chapman,  MD

## 2024-03-05 NOTE — PROGRESS NOTES
[facility]    Medicine Progress Note - Hospitalist Service    Date of Admission:  3/4/2024    Assessment & Plan     Enrique Long is a 73 year old male PMH followed by [facility] Team  diabetes type 1 with insulin pump, HFpEF presents to the ED with severe abdominal pain with erythema at insulin pump site onset yesterday      Abdominal wall cellulitis at insulin pump site  Sepsis secondary to above  Enrique Long is a 73 year old male with a past medical history significant for diabetes type 1, CHF who presents to the ED via/accompanied by wife with a chief complaint of severe abdominal pain onset yesterday after switching the site of the OmniPod with associated erythema surrounding site.    In ED, afebrile, WBC 21.4 K, CRP 53.52.  LA 3.4, CR 1.52, normal BUN, random glucose 198, CT abdomen left skin thickening and subcu soft tissue stranding, no abscess or foreign body appreciated.  Soft tissue ultrasound pending.  Started on Zosyn and Vanco.  Given LR 1.5 L bolus.     -Was started on vancomycin and Zosyn, MRSA nasal swab negative, ID consulted, vancomycin was changed to daptomycin, subsequently stopped and now continuing with Rocephin and clindamycin given suspected cellulitis due to Streptococcus.   - Lactic acid normalized.  -Follow-up BC- NGTD, follow WBC, CRP, temperature profile  -Abdominal wall ultrasound shows soft tissue swelling but no fluid collection.  -Optimize glycemic control, continue insulin pump protocol.  -Continue pain management as ordered  -ID consult appreciated        Acute kidney injury  Anion gap metabolic acidosis, lactic acidosis  Last CR on record 7/2023 at [facility] 1.0.  Patient presented with a creatinine of 1.52.  Lactic acid 3.4.  -Creatinine trended up 1.5 to--> 2.66--> 2.69  -ITALIA is multifactorial :- reports taking Aleve for pain, he is on Lasix, hydrochlorothiazide, losartan.  Patient also received contrast for CT scan on admission.  Patient has sepsis  due to cellulitis.  -Bolus and maintenance IVF ordered and nephrology consulted.  Patient with subjective dyspnea after IVF bolus and chest x-ray shows mild pulmonary congestion but on room air.  IVF discontinued, hold off on diuretic.  -UA has 19 WBCs, leukocyte esterase, RBCs, hyaline cast and amorphous crystals.  -Hold PTA meds including HCTZ, Lasix, losartan.  -Repeat BMP this afternoon shows fairly stable creatinine, likely has peaked.  -Avoid nephrotoxic medications, continue to hold PTA Lasix, hydrochlorothiazide, losartan.  -Blood pressure was low normal, hold parameters in place for low-dose amlodipine and propranolol    Type 1 diabetes, 68 years on insulin pump  Diabetic neuropathy, lower extremity  Albuminuria  Followed by Dr. Garcia at, endocrinology, [facility].  -Continue insulin pump protocol, basal rate, with carb counting,   Noted insulin pump injection site changed 3/3/2024.  Patient is able to manage his pump on his own.    -Advance diet as tolerated to modified carb diet.  -Monitor glucoses, adjust insulin regimen accordingly.  -Follow-up with Dr. Garcia as outpatient on hospital discharge.     HFpEF  Hypertension.   PTA on ASA 81 mg,  amlodipine, Hyzaar, furosemide 20 mg as needed for LE edema   -Continue PTA amlodipine with hold parameters in place  - hold losartan, HCTZ and furosemide for now given ITALIA.  -Follow-up [facility]ology/[facility] as outpatient on hospital discharge  -Mildly dyspneic only with activity after IVF.  Chest x-ray shows mild pulmonary congestion.  Patient on room air.  Hold off on Lasix unless dyspnea at rest or hypoxia.    Obstructive sleep apnea  -Resume CPAP    Chronic anemia  Hb 10-11, suspect underlying CKD.  Denies any history of bleeding including hematochezia or melena  -Monitor     Essential tremor  Patient reports longstanding, on Mysoline and propranolol continue             Diet: Combination Diet Regular Diet Adult; Moderate Consistent Carb (60 g CHO per Meal) Diet   "  DVT Prophylaxis: Pneumatic Compression Devices/heparin SQ  Valentin Catheter: Not present  Lines: None     Cardiac Monitoring: None  Code Status: Full Code      Clinically Significant Risk Factors          # Hypocalcemia: Lowest Ca = 7.9 mg/dL in last 2 days, will monitor and replace as appropriate        # Acute Kidney Injury, unspecified: based on a >150% or 0.3 mg/dL increase in last creatinine compared to past 90 day average, will monitor renal function         # Overweight: Estimated body mass index is 28.29 kg/m  as calculated from the following:    Height as of this encounter: 1.651 m (5' 5\").    Weight as of this encounter: 77.1 kg (170 lb)., PRESENT ON ADMISSION            Disposition Plan      Expected Discharge Date: 03/07/2024                    Ortiz Madison MD  Hospitalist Service  [facility]  Securely message with [employer] (more info)  Text page via [employer] Paging/Directory   ______________________________________________________________________    Interval History     Patient was seen this morning and followed up this afternoon.  Ongoing discomfort/pain in the abdominal wall.  Wife at bedside, feels area appears the same.  -Patient reports decreased urine output.  Noted creatinine has worsened.  -Noted Tmax 100.8 in the last 24 hours, mostly 99 today.  -Denies nausea, diarrhea.  Denied any respiratory symptoms this morning although reported feeling short of breath only with activity in the afternoon.    Physical Exam   Vital Signs: Temp: 99.1  F (37.3  C) Temp src: Oral BP: 110/46 Pulse: 82   Resp: 18 SpO2: 96 % O2 Device: None (Room air)    Weight: 170 lbs 0 oz    General: AAOx3, very pleasant, appears comfortable.  HEENT: PERRLA EOMI. Mucosa moist.   Lungs: Bilateral equal air entry.  Diminished but no wheezing or crackles, normal work of breathing.  On room air  CVS: S1S2 regular, no tachycardia or murmur.   Abdomen: Soft, distended, indurated tender abdominal wall with " faint erythema not clearly demarcated but area has been outlined today. BS heard.  Insulin pump and Dexcom attached to abdominal wall few inches oval the area of redness/induration  MSK: Legs appear puffy, trace edema present.  Neuro: AAOX3. CN 2-12 normal. Strength symmetrical.  Skin: No rash.  Faint redness in the abdominal wall as above, no open wound or drainage      Medical Decision Making       52 MINUTES SPENT BY ME on the date of service doing chart review, history, exam, documentation & further activities per the note.      Data     I have personally reviewed the following data over the past 24 hrs:    22.6 (H)  \   10.9 (L)   / 193     138 105 49.8 (H) /  172 (H)   4.3 20 (L) 2.69 (H) \     Trop: N/A BNP: 5,104 (H)     Procal: N/A CRP: N/A Lactic Acid: 1.9         Imaging results reviewed over the past 24 hrs:   Recent Results (from the past 24 hour(s))   XR Chest 2 Views    Narrative    XR CHEST 2 VIEWS  3/5/2024 3:18 PM       INDICATION: dyspnea  COMPARISON: None       Impression    IMPRESSION: Mild cardiomegaly with pulmonary vascular congestion.  Small bilateral pleural effusions. No focal infiltrate.    SIDNEY ALVAREZ MD         SYSTEM ID:  [id number]

## 2024-03-05 NOTE — PLAN OF CARE
Goal Outcome Evaluation:       Shift date/time: 3/4/24 [id number]  Diagnosis/summary: L abd cellulitis, sepsis,   Hx DM, ITALIA, CHF  Orientation/Cognitive: A&OX4  BEHAVIOR & AGGRESSION TOOL COLOR: Green  Pain Management: mild abd discomfort w/movement, otherwise denies  Tele/VS/O2: VSS@RA  ABNL Lab/BG: agreeable only for 8pm BG checks-92  Diet: mod carb  Bowel/Bladder: continent of B/B, voided in BR  Skin : L abd cellulitis, L heel scabbed   Drains/Devices: PIV SL, L w/int. abx  Other info: implanted ambulatory insulin pump L abd w/Dexcom CGM, pt controlled.   Discharge date/time: pending

## 2024-03-05 NOTE — PROGRESS NOTES
[facility]    Infectious Disease Progress Note    Date of Service (when I saw the patient): 03/05/2024     Assessment & Plan   Enrique Long is a 73 year old who was admitted on 3/4/2024.     Impression:  73-year-old male with past medical history of diabetes  CHF  Admitted with abdominal pain that started after switching site of omnipod along with local erythema  Also found to be in ITALIA  Started on Vanco and Zosyn  Blood cultures done they are pending  CT abdomen with left abdominal skin thickening and subcutaneous tissue stranding consistent with cellulitis     Recommendations;   MRSA pcr is negative  Very streptococcal appearing cellulitis   Will stop daptomycin and Zosyn  Started on ceftriaxone and clindamycin  Follow abdomen closely    Herminio Duong MD    Interval History   Tolerating antibiotics ok   No new rashes or issues with antibiotics   Labs reviewed   No changes to past medical, social or family history   Fever is down  Creatinine up still again      Physical Exam   Temp: 98.9  F (37.2  C) Temp src: Oral BP: 103/47 Pulse: 86   Resp: 18 SpO2: 96 % O2 Device: None (Room air)    Vitals:    03/04/24 0618   Weight: 77.1 kg (170 lb)     Vital Signs with Ranges  Temp:  [98.9  F (37.2  C)-100.8  F (38.2  C)] 98.9  F (37.2  C)  Pulse:  [79-89] 86  Resp:  [16-18] 18  BP: ()/(43-68) 103/47  SpO2:  [90 %-98 %] 96 %    Constitutional: Awake, alert, cooperative, no apparent distress  Lungs: Clear to auscultation bilaterally, no crackles or wheezing  Cardiovascular: Regular rate and rhythm, normal S1 and S2, and no murmur noted  Abdomen: Left abdomen with redness  Skin: No rashes, no cyanosis, no edema  Other:    Medications    insulin basal rate for inpatient ambulatory pump 0.6 Units/hr (03/04/24 1336)    sodium chloride        amLODIPine  2.5 mg Oral Daily    aspirin  81 mg Oral Daily    cefTRIAXone  2 g Intravenous Q24H    clindamycin  900 mg Intravenous Q8H    heparin  "ANTICOAGULANT  5,000 Units Subcutaneous Q12H    insulin aspart   Device See Admin Instructions    insulin   Subcutaneous TID AC    insulin   Subcutaneous TID AC    multivitamin, therapeutic  1 tablet Oral Daily    primidone  100 mg Oral At Bedtime    propranolol ER  60 mg Oral Daily    rosuvastatin  10 mg Oral Daily    sodium chloride (PF)  3 mL Intracatheter Q8H    sodium chloride 0.9%  500 mL Intravenous Once       Data   All microbiology laboratory data reviewed.  Recent Labs   Lab Test 03/05/24  0829 03/04/24  0652   WBC 22.6* 21.4*   HGB 10.9* 11.4*   HCT 33.4* 36.0*   MCV 68* 69*    223     Recent Labs   Lab Test 03/05/24  0829 03/04/24  0652   CR 2.66* 1.52*     No lab results found.  No lab results found.    Invalid input(s): \"UC\"    All cultures:  Recent Labs   Lab 03/04/24  0725 03/04/24  0652   CULTURE No growth after 12 hours No growth after 12 hours      Blood culture:  Results for orders placed or performed during the hospital encounter of 03/04/24   Blood Culture Peripheral Blood    Specimen: Peripheral Blood   Result Value Ref Range    Culture No growth after 12 hours    Blood Culture Peripheral Blood    Specimen: Peripheral Blood   Result Value Ref Range    Culture No growth after 12 hours       Urine culture:  No results found for this or any previous visit.          "

## 2024-03-05 NOTE — PLAN OF CARE
Goal Outcome Evaluation:  Summary:  L abdominal cellulitis, sepsis, DM, ITALIA, CHF  DATE & TIME: 03/04/24 [id number]    Cognitive Concerns/ Orientation : A&Ox4   BEHAVIOR & AGGRESSION TOOL COLOR: Green  CIWA SCORE: NA   ABNL VS/O2: Soft BP 90s/50s. TMAX 100.5 relief w/ tylenol  MOBILITY: SBA. Pt using walker in hospital d/t pain. Walks independently at baseline.  PAIN MANAGMENT: LLQ pain 3\10 - relief w/ heat packs. Denied pain medication.  DIET: Mod carb  BOWEL/BLADDER: Continent. Emesis x1 - denied antiemetics.   ABNL LAB/BG: Lactic 2.8 recheck , 130, next dexcom BG at 8pm per MD verbal order since pt manages it himself and MAR dosing is different.  DRAIN/DEVICES: L PIV  TELEMETRY RHYTHM: NA  SKIN: L abdominal cellulitis, L heel wound, dry flaky skin  TESTS/PROCEDURES: Blood cxs. CT & US to r/o remnant of omnipod from old site.   D/C DAY/GOALS/PLACE: pending  OTHER IMPORTANT INFO: IV daptomycin

## 2024-03-06 LAB
ANION GAP SERPL CALCULATED.3IONS-SCNC: 10 MMOL/L (ref 7–15)
BACTERIA UR CULT: NO GROWTH
BASOPHILS # BLD AUTO: ABNORMAL 10*3/UL
BASOPHILS # BLD MANUAL: 0 10E3/UL (ref 0–0.2)
BASOPHILS NFR BLD AUTO: ABNORMAL %
BASOPHILS NFR BLD MANUAL: 0 %
BUN SERPL-MCNC: 62.1 MG/DL (ref 8–23)
BURR CELLS BLD QL SMEAR: ABNORMAL
CALCIUM SERPL-MCNC: 7.9 MG/DL (ref 8.8–10.2)
CHLORIDE SERPL-SCNC: 104 MMOL/L (ref 98–107)
CREAT SERPL-MCNC: 2.9 MG/DL (ref 0.67–1.17)
CRP SERPL-MCNC: 251.16 MG/L
DEPRECATED HCO3 PLAS-SCNC: 21 MMOL/L (ref 22–29)
EGFRCR SERPLBLD CKD-EPI 2021: 22 ML/MIN/1.73M2
ELLIPTOCYTES BLD QL SMEAR: ABNORMAL
EOSINOPHIL # BLD AUTO: ABNORMAL 10*3/UL
EOSINOPHIL # BLD MANUAL: 0 10E3/UL (ref 0–0.7)
EOSINOPHIL NFR BLD AUTO: ABNORMAL %
EOSINOPHIL NFR BLD MANUAL: 0 %
ERYTHROCYTE [DISTWIDTH] IN BLOOD BY AUTOMATED COUNT: 16.4 % (ref 10–15)
GLUCOSE BLDC GLUCOMTR-MCNC: 105 MG/DL (ref 70–99)
GLUCOSE BLDC GLUCOMTR-MCNC: 118 MG/DL (ref 70–99)
GLUCOSE BLDC GLUCOMTR-MCNC: 162 MG/DL (ref 70–99)
GLUCOSE BLDC GLUCOMTR-MCNC: 167 MG/DL (ref 70–99)
GLUCOSE BLDC GLUCOMTR-MCNC: 264 MG/DL (ref 70–99)
GLUCOSE SERPL-MCNC: 135 MG/DL (ref 70–99)
HCT VFR BLD AUTO: 30.2 % (ref 40–53)
HGB BLD-MCNC: 9.9 G/DL (ref 13.3–17.7)
IMM GRANULOCYTES # BLD: ABNORMAL 10*3/UL
IMM GRANULOCYTES NFR BLD: ABNORMAL %
LYMPHOCYTES # BLD AUTO: ABNORMAL 10*3/UL
LYMPHOCYTES # BLD MANUAL: 1.3 10E3/UL (ref 0.8–5.3)
LYMPHOCYTES NFR BLD AUTO: ABNORMAL %
LYMPHOCYTES NFR BLD MANUAL: 5 %
MCH RBC QN AUTO: 21.8 PG (ref 26.5–33)
MCHC RBC AUTO-ENTMCNC: 32.8 G/DL (ref 31.5–36.5)
MCV RBC AUTO: 66 FL (ref 78–100)
METAMYELOCYTES # BLD MANUAL: 4.4 10E3/UL
METAMYELOCYTES NFR BLD MANUAL: 17 %
MONOCYTES # BLD AUTO: ABNORMAL 10*3/UL
MONOCYTES # BLD MANUAL: 0.8 10E3/UL (ref 0–1.3)
MONOCYTES NFR BLD AUTO: ABNORMAL %
MONOCYTES NFR BLD MANUAL: 3 %
MYELOCYTES # BLD MANUAL: 1 10E3/UL
MYELOCYTES NFR BLD MANUAL: 4 %
NEUTROPHILS # BLD AUTO: ABNORMAL 10*3/UL
NEUTROPHILS # BLD MANUAL: 18.4 10E3/UL (ref 1.6–8.3)
NEUTROPHILS NFR BLD AUTO: ABNORMAL %
NEUTROPHILS NFR BLD MANUAL: 71 %
NRBC # BLD AUTO: 0 10E3/UL
NRBC BLD AUTO-RTO: 0 /100
PLAT MORPH BLD: ABNORMAL
PLATELET # BLD AUTO: 170 10E3/UL (ref 150–450)
POTASSIUM SERPL-SCNC: 3.9 MMOL/L (ref 3.4–5.3)
RBC # BLD AUTO: 4.55 10E6/UL (ref 4.4–5.9)
RBC MORPH BLD: ABNORMAL
SODIUM SERPL-SCNC: 135 MMOL/L (ref 135–145)
WBC # BLD AUTO: 25.9 10E3/UL (ref 4–11)

## 2024-03-06 PROCEDURE — 250N000011 HC RX IP 250 OP 636: Performed by: INTERNAL MEDICINE

## 2024-03-06 PROCEDURE — 86140 C-REACTIVE PROTEIN: CPT | Performed by: HOSPITALIST

## 2024-03-06 PROCEDURE — 99232 SBSQ HOSP IP/OBS MODERATE 35: CPT | Performed by: INTERNAL MEDICINE

## 2024-03-06 PROCEDURE — 250N000013 HC RX MED GY IP 250 OP 250 PS 637: Performed by: HOSPITALIST

## 2024-03-06 PROCEDURE — 85027 COMPLETE CBC AUTOMATED: CPT | Performed by: HOSPITALIST

## 2024-03-06 PROCEDURE — 120N000001 HC R&B MED SURG/OB

## 2024-03-06 PROCEDURE — 80048 BASIC METABOLIC PNL TOTAL CA: CPT | Performed by: HOSPITALIST

## 2024-03-06 PROCEDURE — 85007 BL SMEAR W/DIFF WBC COUNT: CPT | Performed by: HOSPITALIST

## 2024-03-06 PROCEDURE — 250N000011 HC RX IP 250 OP 636: Performed by: HOSPITALIST

## 2024-03-06 PROCEDURE — 99233 SBSQ HOSP IP/OBS HIGH 50: CPT | Performed by: HOSPITALIST

## 2024-03-06 PROCEDURE — 36415 COLL VENOUS BLD VENIPUNCTURE: CPT | Performed by: HOSPITALIST

## 2024-03-06 RX ORDER — FUROSEMIDE 10 MG/ML
60 INJECTION INTRAMUSCULAR; INTRAVENOUS ONCE
Status: COMPLETED | OUTPATIENT
Start: 2024-03-06 | End: 2024-03-06

## 2024-03-06 RX ADMIN — OXYCODONE HYDROCHLORIDE 5 MG: 5 TABLET ORAL at 11:54

## 2024-03-06 RX ADMIN — MELATONIN TAB 3 MG 3 MG: 3 TAB at 21:41

## 2024-03-06 RX ADMIN — HEPARIN SODIUM 5000 UNITS: 5000 INJECTION, SOLUTION INTRAVENOUS; SUBCUTANEOUS at 21:40

## 2024-03-06 RX ADMIN — FUROSEMIDE 60 MG: 10 INJECTION, SOLUTION INTRAMUSCULAR; INTRAVENOUS at 13:53

## 2024-03-06 RX ADMIN — CEFTRIAXONE SODIUM 2 G: 2 INJECTION, POWDER, FOR SOLUTION INTRAMUSCULAR; INTRAVENOUS at 11:08

## 2024-03-06 RX ADMIN — CLINDAMYCIN PHOSPHATE 900 MG: 900 INJECTION, SOLUTION INTRAVENOUS at 18:11

## 2024-03-06 RX ADMIN — CLINDAMYCIN PHOSPHATE 900 MG: 900 INJECTION, SOLUTION INTRAVENOUS at 11:49

## 2024-03-06 RX ADMIN — MULTIVITAMIN TABLET 1 TABLET: TABLET at 08:33

## 2024-03-06 RX ADMIN — CLINDAMYCIN PHOSPHATE 900 MG: 900 INJECTION, SOLUTION INTRAVENOUS at 02:51

## 2024-03-06 RX ADMIN — ROSUVASTATIN CALCIUM 10 MG: 10 TABLET, FILM COATED ORAL at 08:33

## 2024-03-06 RX ADMIN — AMLODIPINE BESYLATE 2.5 MG: 2.5 TABLET ORAL at 08:34

## 2024-03-06 RX ADMIN — HEPARIN SODIUM 5000 UNITS: 5000 INJECTION, SOLUTION INTRAVENOUS; SUBCUTANEOUS at 11:07

## 2024-03-06 RX ADMIN — ASPIRIN 81 MG: 81 TABLET, COATED ORAL at 08:33

## 2024-03-06 RX ADMIN — PROPRANOLOL HYDROCHLORIDE 60 MG: 60 CAPSULE, EXTENDED RELEASE ORAL at 08:33

## 2024-03-06 RX ADMIN — PRIMIDONE 100 MG: 50 TABLET ORAL at 21:41

## 2024-03-06 ASSESSMENT — ACTIVITIES OF DAILY LIVING (ADL)
ADLS_ACUITY_SCORE: 27
ADLS_ACUITY_SCORE: 28
ADLS_ACUITY_SCORE: 28
ADLS_ACUITY_SCORE: 27
ADLS_ACUITY_SCORE: 28
ADLS_ACUITY_SCORE: 27
ADLS_ACUITY_SCORE: 28
ADLS_ACUITY_SCORE: 27
ADLS_ACUITY_SCORE: 28
ADLS_ACUITY_SCORE: 26
ADLS_ACUITY_SCORE: 27
ADLS_ACUITY_SCORE: 28

## 2024-03-06 NOTE — PLAN OF CARE
Summary:  Left abdominal cellulitis, sepsis, DM, ITALIA, CHF  DATE & TIME: 03/05/24, 1930 - 0730    Cognitive Concerns/ Orientation : A&Ox4   BEHAVIOR & AGGRESSION TOOL COLOR: Green  CIWA SCORE: NA   ABNL VS/O2: VSS on room air. Slept with home CPAP  MOBILITY: Independent in room.   PAIN MANAGMENT: Denied  DIET: Mod carb.   BOWEL/BLADDER: Continent  ABNL LAB/BG: /118/105  DRAIN/DEVICES: Left PIV SL  TELEMETRY RHYTHM: NA  SKIN: Abdominal cellulitis outlined, left heel wound, dry flaky skin. Insulin pump to abdomen. Patient manages pump  TESTS/PROCEDURES: NA  D/C DAY/GOALS/PLACE: pending  OTHER IMPORTANT INFO: Redness to abdomen outlined. Redness does not exceed area outlined. Started on IV ceftriaxone and clindamycin. MD said okay to documenting in MAR insulin pump NOT GIVEN since patient is oriented and manages it himself. Melatonin given for sleep  NEW INSULIN VIAL needs to be ordered in 3 days 03/08/24 if patient still here. Vials are single use.     Goal Outcome Evaluation:      Plan of Care Reviewed With: patient    Overall Patient Progress: no changeOverall Patient Progress: no change

## 2024-03-06 NOTE — PLAN OF CARE
Goal Outcome Evaluation:  Summary:  L abdominal cellulitis, sepsis, DM, ITALIA, CHF  DATE & TIME: 03/05/24 0010-2187    Cognitive Concerns/ Orientation : A&Ox4   BEHAVIOR & AGGRESSION TOOL COLOR: Green  CIWA SCORE: NA   ABNL VS/O2: Soft BP 90s/50s - MD ordered to hold AM BP meds.  MOBILITY: IND in room. Pt SBA using walker in halls d/t pain. Walks independently at baseline.  PAIN MANAGMENT: LLQ pain 8/10 - relief w/ tylenol, heat & cold packs.   DIET: Mod carb. Good appetite.  BOWEL/BLADDER: Continent  ABNL LAB/BG: Lactic 1.9 , 163, 188 , Cr 2.66 BNP 5,104  DRAIN/DEVICES: L PIV SL  TELEMETRY RHYTHM: NA  SKIN: L abdominal cellulitis outlined, L heel wound, dry flaky skin  TESTS/PROCEDURES: Blood cxs. CT & US to r/o remnant of omnipod from old site. UC collected this shift - see results. CXR - see results.  D/C DAY/GOALS/PLACE: pending  OTHER IMPORTANT INFO: Started on IV ceftriaxone and clindamycin. MD said okay to documenting in MAR insulin pump NOT GIVEN since patient is oriented and manages it himself. Pt wanted something for sleep - PRN melatonin available for tonight. He has his own CPAP and can set it up himself.    NEW INSULIN VIAL needs to be ordered in 3 days 03/08 if pt is still here. Vials are single use. Pt has his own omnipod pump to fill.

## 2024-03-06 NOTE — PROGRESS NOTES
"Nephrology Progress Note  03/06/2024         Assessment & Recommendations:   1  acute renal failure-last known creatinine of 1 in July 2023.  Microalbuminuria around 200 mg/gram in June 2023 (type I DM for more than 65 years)  -Prerenal ITALIA with poor oral intake, losartan/hydrochlorothiazide, regular use of high-dose NSAIDs and SIRS.  Further worsened by IV contrast     CT abdomen shows normal-looking kidneys  Urine sodium of less than 20  Urinalysis shows pyuria, microscopic hematuria, 1+ protein, hyaline casts  Urine sodium less than 20, likely with contrast nephropathy    -Creatinine worse.  Rate of rise of creatinine slowing down.     2 abdominal wall cellulitis-on ceftriaxone, clindamycin     3 type 1 diabetes mellitus-on insulin.  Denies history of retinopathy.  Has albuminuria     4 essential hypertension-continue amlodipine.  Hold losartan/hydrochlorothiazide.     Advised against regular use of NSAIDs  Avoid further IV contrast use  Strict I's/O  Lasix 60 mg IV x 1  Daily renal panel  Continue supportive management    Recommendations were communicated to primary team     April Chapman MD  [facility] - Nephrology   [phone]      Interval History :   Seen / examined.   Noted mild shortness of breath, especially with exertion yesterday and start IV fluid  Denies dyspnea at rest  More tired today  Afebrile  Remains oliguric        Physical Exam:   I/O last 3 completed shifts:  In: 600 [P.O.:600]  Out: 150 [Urine:150]  /52 (BP Location: Left arm)   Pulse 94   Temp 99.7  F (37.6  C) (Oral)   Resp 18   Ht 1.651 m (5' 5\")   Wt 85.1 kg (187 lb 9.6 oz)   SpO2 95%   BMI 31.22 kg/m      GENERAL APPEARANCE: no distress,  awake  Pulmonary: Few crackles at bases  CV: regular rhythm, normal rate, no rub   - JVP -   - Edema-  GI: soft, abdominal wall erythema  MS: no evidence of inflammation in joints  : no fuentes  SKIN: no rash, warm, dry, no cyanosis  NEURO: face symmetric, no asterixis "     Labs:   All labs reviewed by me  Electrolytes/Renal -   Recent Labs   Lab Test 03/06/24  0857 03/06/24  0524 03/06/24  0251 03/05/24  2139 03/05/24  1557 03/05/24  1146 03/05/24  0829     --   --   --  138  --  139   POTASSIUM 3.9  --   --   --  4.3  --  4.4   CHLORIDE 104  --   --   --  105  --  105   CO2 21*  --   --   --  20*  --  20*   BUN 62.1*  --   --   --  49.8*  --  44.6*   CR 2.90*  --   --   --  2.69*  --  2.66*   * 105* 118*   < > 172*   < > 163*   PILY 7.9*  --   --   --  7.9*  --  8.1*    < > = values in this interval not displayed.       CBC -   Recent Labs   Lab Test 03/06/24  0857 03/05/24  0829 03/04/24  0652   WBC 25.9* 22.6* 21.4*   HGB 9.9* 10.9* 11.4*    193 223       LFTs -   Recent Labs   Lab Test 03/04/24  0652   ALKPHOS 93   BILITOTAL 0.6   ALT 61   AST 78*   PROTTOTAL 6.2*   ALBUMIN 3.7         Current Medications:   amLODIPine  2.5 mg Oral Daily    aspirin  81 mg Oral Daily    cefTRIAXone  2 g Intravenous Q24H    clindamycin  900 mg Intravenous Q8H    heparin ANTICOAGULANT  5,000 Units Subcutaneous Q12H    insulin aspart   Device See Admin Instructions    insulin   Subcutaneous TID AC    insulin   Subcutaneous TID AC    melatonin  3 mg Oral At Bedtime    multivitamin, therapeutic  1 tablet Oral Daily    primidone  100 mg Oral At Bedtime    propranolol ER  60 mg Oral Daily    rosuvastatin  10 mg Oral Daily    sodium chloride (PF)  3 mL Intracatheter Q8H      insulin basal rate for inpatient ambulatory pump 0.6 Units/hr (03/04/24 1336)     April Chapman MD

## 2024-03-06 NOTE — PROGRESS NOTES
[facility]    Medicine Progress Note - Hospitalist Service    Date of Admission:  3/4/2024    Assessment & Plan     Enrique Long is a 73 year old male PMH followed by [facility]  diabetes type 1 with insulin pump, HFpEF presents to the ED with severe abdominal pain with erythema at insulin pump site onset yesterday      Abdominal wall cellulitis at insulin pump site  Sepsis secondary to above  Enrique Long is a 73 year old male with a past medical history significant for diabetes type 1, CHF who presents to the ED via/accompanied by wife with a chief complaint of severe abdominal pain onset yesterday after switching the site of the OmniPod with associated erythema surrounding site.    In ED, afebrile, WBC 21.4 K, CRP 53.52.  LA 3.4, CR 1.52, normal BUN, random glucose 198, CT abdomen left skin thickening and subcu soft tissue stranding, no abscess or foreign body appreciated.  Soft tissue ultrasound pending.  Started on Zosyn and Vanco.  Given LR 1.5 L bolus.     -Was started on vancomycin and Zosyn, MRSA nasal swab negative, ID consulted, vancomycin was changed to daptomycin, subsequently stopped and now continuing with Rocephin and clindamycin given suspected cellulitis due to Streptococcus. Appreciate ID assistance  - Lactic acid normalized.  -Follow-up BC- NGTD, follow WBC up to 22 K, CRP-AM labs pending  -Abdominal wall ultrasound shows soft tissue swelling but no fluid collection.  -Optimize glycemic control, continue insulin pump protocol.  -Continue pain management as ordered      Acute kidney injury  Anion gap metabolic acidosis, lactic acidosis  Last CR on record 7/2023 at [facility] 1.0.  Patient presented with a creatinine of 1.52.  Lactic acid 3.4.  -Creatinine trended up 1.5 to--> 2.66--> 2.69-->pending this AM  -ITALIA is multifactorial :- reports taking Aleve for pain, he is on Lasix, hydrochlorothiazide, losartan.  Patient also received contrast for CT scan on admission.   Patient has sepsis due to cellulitis.  -Bolus and maintenance IVF ordered and nephrology consulted.  Patient with subjective dyspnea after IVF bolus and chest x-ray shows mild pulmonary congestion but on room air.  IVF discontinued.  - Noted weight up. If Cr stable, possible resume lasix, will discuss with nephrology.   -UA has 19 WBCs, leukocyte esterase, RBCs, hyaline cast and amorphous crystals.  -Holding PTA meds including HCTZ, Lasix, losartan.   -Repeat BMP this afternoon shows fairly stable creatinine, likely has peaked.  -Avoid nephrotoxic medications, continue to hold PTA Lasix, hydrochlorothiazide, losartan.  -Blood pressure was low normal, hold parameters in place for low-dose amlodipine and propranolol    Type 1 diabetes, 68 years on insulin pump  Diabetic neuropathy, lower extremity  Albuminuria  Followed by Dr. Garcia at, endocrinology, [facility].  -Continue insulin pump protocol, basal rate, with carb counting,   Noted insulin pump injection site changed 3/3/2024.  Patient is able to manage his pump on his own.    -Advance diet as tolerated to modified carb diet.  -Monitor glucoses, adjust insulin regimen accordingly.  -Follow-up with Dr. Garcia as outpatient on hospital discharge.     HFpEF  Hypertension.   PTA on ASA 81 mg,  amlodipine, Hyzaar, furosemide 20 mg as needed for LE edema   -Continue PTA amlodipine with hold parameters in place  - hold losartan, HCTZ and furosemide for now given ITALIA.  -Follow-up [facility] as outpatient on hospital discharge  -Mildly dyspneic only with activity after IVF.  Chest x-ray shows mild pulmonary congestion.  Patient on room air.   -Resume lasix if ok with nephrology    Obstructive sleep apnea  -Continue CPAP    Chronic anemia  Hb 10-11, suspect underlying CKD.  Denies any history of bleeding including hematochezia or melena  -Monitor     Essential tremor  Patient reports longstanding, on Mysoline and propranolol continue        Diet: Combination Diet  "Regular Diet Adult; Moderate Consistent Carb (60 g CHO per Meal) Diet    DVT Prophylaxis: Pneumatic Compression Devices/heparin SQ  Valentin Catheter: Not present  Lines: None     Cardiac Monitoring: None  Code Status: Full Code      Clinically Significant Risk Factors                   # Acute Kidney Injury, unspecified: based on a >150% or 0.3 mg/dL increase in last creatinine compared to past 90 day average, will monitor renal function         # Obesity: Estimated body mass index is 31.22 kg/m  as calculated from the following:    Height as of this encounter: 1.651 m (5' 5\").    Weight as of this encounter: 85.1 kg (187 lb 9.6 oz)., PRESENT ON ADMISSION            Disposition Plan     Expected Discharge Date: 03/07/2024                    Ortiz Madison MD  Hospitalist Service  [facility]  Securely message with [employer] (more info)  Text page via [facility] Paging/Directory   ______________________________________________________________________    Interval History     Reviewed, discussed with nursing staff and patient was seen this morning.  Patient states pain in the abdominal wall is improved.  -Reports he is urinating well, intake and output has not been documented.  Weight is fluctuated and has gained since admission although does not seem reliable.  -Used CPAP overnight.  He is up this morning, has not ambulated but denies dyspnea while at rest.   -A.m. labs pending    Physical Exam   Vital Signs: Temp: 99.7  F (37.6  C) Temp src: Oral BP: 121/52 Pulse: 94   Resp: 18 SpO2: 95 % O2 Device: None (Room air)    Weight: 187 lbs 9.6 oz    General: AAOx3, very pleasant, appears comfortable.  HEENT: PERRLA EOMI. Mucosa moist.   Lungs: Bilateral equal air entry.  Diminished and with fine crepitations posteriorly, no wheezing, normal work of breathing. On room air  CVS: S1S2 regular, no tachycardia or murmur.   Abdomen: Soft, distended, indurated tender abdominal wall with faint erythema not " clearly demarcated. Area appears stable. BS heard.  Insulin pump and Dexcom attached to abdominal wall few inches oval the area of redness/induration  MSK: Legs appear puffy, trace edema present.  Neuro: AAOX3. CN 2-12 normal. Strength symmetrical.  Skin: No rash.  Faint redness in the abdominal wall as above, no open wound or drainage      Medical Decision Making       40 MINUTES SPENT BY ME on the date of service doing chart review, history, exam, documentation & further activities per the note.      Data     I have personally reviewed the following data over the past 24 hrs:    N/A  \   N/A   / N/A     138 105 49.8 (H) /  105 (H)   4.3 20 (L) 2.69 (H) \     Trop: N/A BNP: N/A     Procal: N/A CRP: N/A Lactic Acid: 1.9         Imaging results reviewed over the past 24 hrs:   Recent Results (from the past 24 hour(s))   XR Chest 2 Views    Narrative    XR CHEST 2 VIEWS  3/5/2024 3:18 PM       INDICATION: dyspnea  COMPARISON: None       Impression    IMPRESSION: Mild cardiomegaly with pulmonary vascular congestion.  Small bilateral pleural effusions. No focal infiltrate.    SIDNEY ALVAREZ MD         SYSTEM ID:  [id number]

## 2024-03-06 NOTE — PROGRESS NOTES
[facility]    Infectious Disease Progress Note    Date of Service (when I saw the patient): 03/06/2024     Assessment & Plan   Enrique Long is a 73 year old who was admitted on 3/4/2024.     Impression:  73-year-old male with past medical history of diabetes  CHF  Admitted with abdominal pain that started after switching site of omnipod along with local erythema  Also found to be in ITALIA  Started on Vanco and Zosyn  Blood cultures done they are pending  CT abdomen with left abdominal skin thickening and subcutaneous tissue stranding consistent with cellulitis  Persistent leukocytosis, continued elevation in the creatinine     Recommendations;   Continue on ceftriaxone and clindamycin    Herminio Duong MD    Interval History   Tolerating antibiotics ok   No new rashes or issues with antibiotics   Labs reviewed   No changes to past medical, social or family history   Fever is down  Creatinine up still again  WBC still elevated      Physical Exam   Temp: 99.7  F (37.6  C) Temp src: Oral BP: 121/52 Pulse: 94   Resp: 18 SpO2: 95 % O2 Device: None (Room air)    Vitals:    03/04/24 0618 03/06/24 0700   Weight: 77.1 kg (170 lb) 85.1 kg (187 lb 9.6 oz)     Vital Signs with Ranges  Temp:  [98.8  F (37.1  C)-99.7  F (37.6  C)] 99.7  F (37.6  C)  Pulse:  [81-94] 94  Resp:  [18] 18  BP: (109-134)/(46-55) 121/52  SpO2:  [94 %-96 %] 95 %    Constitutional: Awake, alert, cooperative, no apparent distress  Lungs: Clear to auscultation bilaterally, no crackles or wheezing  Cardiovascular: Regular rate and rhythm, normal S1 and S2, and no murmur noted  Abdomen: Left abdomen with redness  Skin: No rashes, no cyanosis, no edema  Other:    Medications    insulin basal rate for inpatient ambulatory pump 0.6 Units/hr (03/04/24 1336)      amLODIPine  2.5 mg Oral Daily    aspirin  81 mg Oral Daily    cefTRIAXone  2 g Intravenous Q24H    clindamycin  900 mg Intravenous Q8H    heparin ANTICOAGULANT  5,000 Units  "Subcutaneous Q12H    insulin aspart   Device See Admin Instructions    insulin   Subcutaneous TID AC    insulin   Subcutaneous TID AC    melatonin  3 mg Oral At Bedtime    multivitamin, therapeutic  1 tablet Oral Daily    primidone  100 mg Oral At Bedtime    propranolol ER  60 mg Oral Daily    rosuvastatin  10 mg Oral Daily    sodium chloride (PF)  3 mL Intracatheter Q8H       Data   All microbiology laboratory data reviewed.  Recent Labs   Lab Test 03/05/24  0829 03/04/24  0652   WBC 22.6* 21.4*   HGB 10.9* 11.4*   HCT 33.4* 36.0*   MCV 68* 69*    223     Recent Labs   Lab Test 03/05/24  1557 03/05/24  0829 03/04/24  0652   CR 2.69* 2.66* 1.52*     No lab results found.  No lab results found.    Invalid input(s): \"UC\"    All cultures:  Recent Labs   Lab 03/04/24  0725 03/04/24  0652   CULTURE No growth after 1 day No growth after 1 day      Blood culture:  Results for orders placed or performed during the hospital encounter of 03/04/24   Blood Culture Peripheral Blood    Specimen: Peripheral Blood   Result Value Ref Range    Culture No growth after 1 day    Blood Culture Peripheral Blood    Specimen: Peripheral Blood   Result Value Ref Range    Culture No growth after 1 day       Urine culture:  No results found for this or any previous visit.          "

## 2024-03-06 NOTE — PLAN OF CARE
Goal Outcome Evaluation:             Summary:  Left abdominal cellulitis, sepsis, DM, ITALIA, CHF  DATE & TIME: 03/06/24 0730-15:30                      Cognitive Concerns/ Orientation : A&Ox4   BEHAVIOR & AGGRESSION TOOL COLOR: Green  CIWA SCORE: NA        ABNL VS/O2: VSS on room air.   MOBILITY: Independent in room.   PAIN MANAGMENT:pain on movement refused painmed in am took it in afternoon with relief  DIET: Mod carb.   BOWEL/BLADDER: Continent  ABNL LAB/BG:   DRAIN/DEVICES: Left PIV SL  TELEMETRY RHYTHM: NA  SKIN: Abdominal cellulitis outlined, left heel wound, dry flaky skin. Insulin pump to abdomen. Patient manages pump  TESTS/PROCEDURES: NA  D/C DAY/GOALS/PLACE: pending  OTHER IMPORTANT INFO: Redness to abdomen outlined. Redness does not exceed area outlined. Started on IV ceftriaxone and clindamycin. MD said okay to documenting in MAR insulin pump NOT GIVEN since patient is oriented and manages it himself.  NEW INSULIN VIAL needs to be ordered in 3 days 03/08/24 if patient still here. Vials are single use.      Goal Outcome Evaluation:       Plan of Care Reviewed With: patient     Overall Patient Progress: no changeOverall Patient Progress: no change

## 2024-03-07 LAB
ANION GAP SERPL CALCULATED.3IONS-SCNC: 15 MMOL/L (ref 7–15)
BASOPHILS # BLD AUTO: 0.1 10E3/UL (ref 0–0.2)
BASOPHILS NFR BLD AUTO: 0 %
BUN SERPL-MCNC: 69.7 MG/DL (ref 8–23)
CALCIUM SERPL-MCNC: 8.6 MG/DL (ref 8.8–10.2)
CHLORIDE SERPL-SCNC: 102 MMOL/L (ref 98–107)
CREAT SERPL-MCNC: 2.58 MG/DL (ref 0.67–1.17)
CRP SERPL-MCNC: 192.83 MG/L
DEPRECATED HCO3 PLAS-SCNC: 22 MMOL/L (ref 22–29)
EGFRCR SERPLBLD CKD-EPI 2021: 25 ML/MIN/1.73M2
EOSINOPHIL # BLD AUTO: 0.2 10E3/UL (ref 0–0.7)
EOSINOPHIL NFR BLD AUTO: 1 %
ERYTHROCYTE [DISTWIDTH] IN BLOOD BY AUTOMATED COUNT: 15.9 % (ref 10–15)
GLUCOSE BLDC GLUCOMTR-MCNC: 111 MG/DL (ref 70–99)
GLUCOSE BLDC GLUCOMTR-MCNC: 119 MG/DL (ref 70–99)
GLUCOSE BLDC GLUCOMTR-MCNC: 157 MG/DL (ref 70–99)
GLUCOSE BLDC GLUCOMTR-MCNC: 172 MG/DL (ref 70–99)
GLUCOSE BLDC GLUCOMTR-MCNC: 232 MG/DL (ref 70–99)
GLUCOSE SERPL-MCNC: 180 MG/DL (ref 70–99)
HCT VFR BLD AUTO: 29.4 % (ref 40–53)
HGB BLD-MCNC: 10 G/DL (ref 13.3–17.7)
IMM GRANULOCYTES # BLD: 0.4 10E3/UL
IMM GRANULOCYTES NFR BLD: 2 %
LYMPHOCYTES # BLD AUTO: 1 10E3/UL (ref 0.8–5.3)
LYMPHOCYTES NFR BLD AUTO: 5 %
MCH RBC QN AUTO: 21.8 PG (ref 26.5–33)
MCHC RBC AUTO-ENTMCNC: 34 G/DL (ref 31.5–36.5)
MCV RBC AUTO: 64 FL (ref 78–100)
MONOCYTES # BLD AUTO: 1 10E3/UL (ref 0–1.3)
MONOCYTES NFR BLD AUTO: 5 %
NEUTROPHILS # BLD AUTO: 19.3 10E3/UL (ref 1.6–8.3)
NEUTROPHILS NFR BLD AUTO: 87 %
NRBC # BLD AUTO: 0 10E3/UL
NRBC BLD AUTO-RTO: 0 /100
PLATELET # BLD AUTO: 168 10E3/UL (ref 150–450)
POTASSIUM SERPL-SCNC: 3.6 MMOL/L (ref 3.4–5.3)
RBC # BLD AUTO: 4.58 10E6/UL (ref 4.4–5.9)
SODIUM SERPL-SCNC: 139 MMOL/L (ref 135–145)
WBC # BLD AUTO: 22 10E3/UL (ref 4–11)

## 2024-03-07 PROCEDURE — 250N000013 HC RX MED GY IP 250 OP 250 PS 637: Performed by: INTERNAL MEDICINE

## 2024-03-07 PROCEDURE — 99232 SBSQ HOSP IP/OBS MODERATE 35: CPT | Performed by: INTERNAL MEDICINE

## 2024-03-07 PROCEDURE — 36415 COLL VENOUS BLD VENIPUNCTURE: CPT | Performed by: HOSPITALIST

## 2024-03-07 PROCEDURE — 250N000011 HC RX IP 250 OP 636: Performed by: INTERNAL MEDICINE

## 2024-03-07 PROCEDURE — 82565 ASSAY OF CREATININE: CPT | Performed by: HOSPITALIST

## 2024-03-07 PROCEDURE — 250N000011 HC RX IP 250 OP 636: Performed by: HOSPITALIST

## 2024-03-07 PROCEDURE — 120N000001 HC R&B MED SURG/OB

## 2024-03-07 PROCEDURE — 86140 C-REACTIVE PROTEIN: CPT | Performed by: HOSPITALIST

## 2024-03-07 PROCEDURE — 250N000013 HC RX MED GY IP 250 OP 250 PS 637: Performed by: HOSPITALIST

## 2024-03-07 PROCEDURE — 85004 AUTOMATED DIFF WBC COUNT: CPT | Performed by: HOSPITALIST

## 2024-03-07 PROCEDURE — 99232 SBSQ HOSP IP/OBS MODERATE 35: CPT | Performed by: HOSPITALIST

## 2024-03-07 RX ADMIN — AMLODIPINE BESYLATE 2.5 MG: 2.5 TABLET ORAL at 09:44

## 2024-03-07 RX ADMIN — BENZOCAINE 6 MG-MENTHOL 10 MG LOZENGES 1 LOZENGE: at 03:05

## 2024-03-07 RX ADMIN — MELATONIN TAB 3 MG 3 MG: 3 TAB at 21:20

## 2024-03-07 RX ADMIN — HEPARIN SODIUM 5000 UNITS: 5000 INJECTION, SOLUTION INTRAVENOUS; SUBCUTANEOUS at 23:46

## 2024-03-07 RX ADMIN — CLINDAMYCIN PHOSPHATE 900 MG: 900 INJECTION, SOLUTION INTRAVENOUS at 12:49

## 2024-03-07 RX ADMIN — CLINDAMYCIN PHOSPHATE 900 MG: 900 INJECTION, SOLUTION INTRAVENOUS at 18:43

## 2024-03-07 RX ADMIN — OXYCODONE HYDROCHLORIDE 5 MG: 5 TABLET ORAL at 21:23

## 2024-03-07 RX ADMIN — ASPIRIN 81 MG: 81 TABLET, COATED ORAL at 09:45

## 2024-03-07 RX ADMIN — PRIMIDONE 100 MG: 50 TABLET ORAL at 21:21

## 2024-03-07 RX ADMIN — HEPARIN SODIUM 5000 UNITS: 5000 INJECTION, SOLUTION INTRAVENOUS; SUBCUTANEOUS at 11:29

## 2024-03-07 RX ADMIN — CEFTRIAXONE SODIUM 2 G: 2 INJECTION, POWDER, FOR SOLUTION INTRAMUSCULAR; INTRAVENOUS at 11:29

## 2024-03-07 RX ADMIN — MULTIVITAMIN TABLET 1 TABLET: TABLET at 09:44

## 2024-03-07 RX ADMIN — ROSUVASTATIN CALCIUM 10 MG: 10 TABLET, FILM COATED ORAL at 09:44

## 2024-03-07 RX ADMIN — CLINDAMYCIN PHOSPHATE 900 MG: 900 INJECTION, SOLUTION INTRAVENOUS at 03:05

## 2024-03-07 RX ADMIN — ACETAMINOPHEN 650 MG: 325 TABLET, FILM COATED ORAL at 16:38

## 2024-03-07 RX ADMIN — PROPRANOLOL HYDROCHLORIDE 60 MG: 60 CAPSULE, EXTENDED RELEASE ORAL at 09:45

## 2024-03-07 ASSESSMENT — ACTIVITIES OF DAILY LIVING (ADL)
ADLS_ACUITY_SCORE: 26
ADLS_ACUITY_SCORE: 27
ADLS_ACUITY_SCORE: 26
ADLS_ACUITY_SCORE: 27
ADLS_ACUITY_SCORE: 26

## 2024-03-07 NOTE — PROGRESS NOTES
[facility]    Infectious Disease Progress Note    Date of Service (when I saw the patient): 03/07/2024     Assessment & Plan   Enrique Long is a 73 year old who was admitted on 3/4/2024.     Impression:  73-year-old male with past medical history of diabetes  CHF  Admitted with abdominal pain that started after switching site of omnipod along with local erythema  Also found to be in ITALIA  Started on Vanco and Zosyn  Blood cultures done they are pending  CT abdomen with left abdominal skin thickening and subcutaneous tissue stranding consistent with cellulitis  Persistent leukocytosis, continued elevation in the creatinine     Recommendations:  No positive micro    Very streptococcal appearing cellulitis with blistering continue on ceftriaxone and clindamycin, now that the creatinine is improving WBC also improving       Herminio Duong MD    Interval History   Tolerating antibiotics ok   No new rashes or issues with antibiotics   Labs reviewed   No changes to past medical, social or family history   Fever is down  Creatinine up still again  WBC still elevated      Physical Exam   Temp: 97.7  F (36.5  C) Temp src: Oral BP: 136/70 Pulse: 70   Resp: 18 SpO2: 99 % O2 Device: None (Room air)    Vitals:    03/04/24 0618 03/06/24 0700   Weight: 77.1 kg (170 lb) 85.1 kg (187 lb 9.6 oz)     Vital Signs with Ranges  Temp:  [97.7  F (36.5  C)-99  F (37.2  C)] 97.7  F (36.5  C)  Pulse:  [69-73] 70  Resp:  [18] 18  BP: (122-136)/(61-70) 136/70  SpO2:  [96 %-99 %] 99 %    Constitutional: Awake, alert, cooperative, no apparent distress  Lungs: Clear to auscultation bilaterally, no crackles or wheezing  Cardiovascular: Regular rate and rhythm, normal S1 and S2, and no murmur noted  Abdomen: Left abdomen with redness and blistering   Skin: No rashes, no cyanosis, no edema  Other:    Medications    insulin basal rate for inpatient ambulatory pump 0.6 Units/hr (03/04/24 1336)      amLODIPine  2.5 mg Oral  "Daily    aspirin  81 mg Oral Daily    cefTRIAXone  2 g Intravenous Q24H    clindamycin  900 mg Intravenous Q8H    heparin ANTICOAGULANT  5,000 Units Subcutaneous Q12H    insulin aspart   Device See Admin Instructions    insulin   Subcutaneous TID AC    insulin   Subcutaneous TID AC    melatonin  3 mg Oral At Bedtime    multivitamin, therapeutic  1 tablet Oral Daily    primidone  100 mg Oral At Bedtime    propranolol ER  60 mg Oral Daily    rosuvastatin  10 mg Oral Daily    sodium chloride (PF)  3 mL Intracatheter Q8H       Data   All microbiology laboratory data reviewed.  Recent Labs   Lab Test 03/07/24  0830 03/06/24  0857 03/05/24  0829   WBC 22.0* 25.9* 22.6*   HGB 10.0* 9.9* 10.9*   HCT 29.4* 30.2* 33.4*   MCV 64* 66* 68*    170 193     Recent Labs   Lab Test 03/07/24  0830 03/06/24  0857 03/05/24  1557   CR 2.58* 2.90* 2.69*     No lab results found.  No lab results found.    Invalid input(s): \"UC\"    All cultures:  Recent Labs   Lab 03/05/24  1117 03/04/24  0725 03/04/24  0652   CULTURE No Growth No growth after 2 days No growth after 2 days      Blood culture:  Results for orders placed or performed during the hospital encounter of 03/04/24   Blood Culture Peripheral Blood    Specimen: Peripheral Blood   Result Value Ref Range    Culture No growth after 2 days    Blood Culture Peripheral Blood    Specimen: Peripheral Blood   Result Value Ref Range    Culture No growth after 2 days       Urine culture:  Results for orders placed or performed during the hospital encounter of 03/04/24   Urine Culture    Specimen: Urine, Midstream   Result Value Ref Range    Culture No Growth              "

## 2024-03-07 NOTE — PROGRESS NOTES
Summary:  Left abdominal cellulitis, sepsis, DM, ITALIA, CHF  DATE & TIME: 03/06/24, Evening    Cognitive Concerns/ Orientation : A&Ox4 - Intermittent confusion   BEHAVIOR & AGGRESSION TOOL COLOR: Green  CIWA SCORE: NA   ABNL VS/O2: VSS on room air. Slept with home CPAP  MOBILITY: Independent in room.   PAIN MANAGMENT: Denied  DIET: Mod carb.   BOWEL/BLADDER: Continent  ABNL LAB/BG:  -   DRAIN/DEVICES: Left PIV SL  TELEMETRY RHYTHM: NA  SKIN: Abdominal cellulitis outlined, left heel wound, dry flaky skin. Insulin pump to abdomen. Patient manages pump  TESTS/PROCEDURES: NA  D/C DAY/GOALS/PLACE: pending  OTHER IMPORTANT INFO: on IV ceftriaxone and clindamycin. MD said okay to documenting in MAR insulin pump NOT GIVEN since patient is oriented and manages it himself. Melatonin given for sleep  NEW INSULIN VIAL needs to be ordered in 3 days 03/08/24 if patient still here. Vials are single use.

## 2024-03-07 NOTE — PROGRESS NOTES
Shift Summary [id number]    Admitting Diagnosis: Cellulitis of abdominal wall [L03.311]  Severe sepsis (H) [A41.9, R65.20]   Vitals: wnl.   Pain 3/10.  Abdominal pain.   A&Ox4  Voiding : continent. Urinal at bedside.   Mobility : Ax1. Walker GB.   Tele : NA.   CMS : WNL.   Lung Sounds clear on room air.   GI : abdominal pain and tenderness in lower quadrant.    Dressing : redness in lower abdomen r/t cellulitis of abdominal wall.      Orders Placed This Encounter      Combination Diet Regular Diet Adult; Moderate Consistent Carb (60 g CHO per Meal) Diet       Plan:   IV Abx with ID consult  Renal labs- nephrology consult.

## 2024-03-07 NOTE — PROGRESS NOTES
[facility]    Medicine Progress Note - Hospitalist Service    Date of Admission:  3/4/2024    Assessment & Plan     Enrique Long is a 73 year old male PMH followed by [facility] Team  diabetes type 1 with insulin pump, HFpEF presents to the ED with severe abdominal pain with erythema at insulin pump site onset yesterday      Abdominal wall cellulitis at insulin pump site  Sepsis secondary to above  Enrique Long is a 73 year old male with a past medical history significant for diabetes type 1, CHF who presents to the ED via/accompanied by wife with a chief complaint of severe abdominal pain onset yesterday after switching the site of the OmniPod with associated erythema surrounding site.    In ED, afebrile, WBC 21.4 K, CRP 53.52.  LA 3.4, CR 1.52, normal BUN, random glucose 198, CT abdomen left skin thickening and subcu soft tissue stranding, no abscess or foreign body appreciated.  Soft tissue ultrasound pending.  Started on Zosyn and Vanco.  Given LR 1.5 L bolus.     -Was started on vancomycin and Zosyn, MRSA nasal swab negative, ID consulted, vancomycin was changed to daptomycin, subsequently stopped and now continuing with Rocephin and clindamycin given suspected cellulitis due to Streptococcus. Appreciate ID assistance    - Lactic acid normalized. BC- NGTD, leucocytosis now slightly improved 21.4-->22.6-->25.9-->22, CRP 53-->251-->192. Follow  -Abdominal wall ultrasound shows soft tissue swelling but no fluid collection. CT as above  -Optimize glycemic control, continue insulin pump protocol.   -Continue pain management as ordered      Acute kidney injury  Anion gap metabolic acidosis, lactic acidosis  Last CR on record 7/2023 at [facility] 1.0.  Patient presented with a creatinine of 1.52.  Lactic acid 3.4.  -Creatinine trended up 1.5 to--> 2.66--> 2.69-->2.9-->2.58  -ITALIA is multifactorial :- reports taking Aleve for pain, he is on Lasix, hydrochlorothiazide, losartan PTA.  Patient  also received contrast for CT scan on admission.  Patient has sepsis due to cellulitis.  -Bolus and maintenance IVF ordered and nephrology consulted.  Patient subsequently had dyspnea after IVF bolus and chest x-ray shows mild pulmonary congestion but on room air.  IVF discontinued.  - Noted weight up. If Cr stable, Lasix PRN per nephrology.   - UA has 19 WBCs, leukocyte esterase, RBCs, hyaline cast and amorphous crystals.  - Holding PTA meds including HCTZ, Lasix, losartan.   - Cr likely has peaked at 2.9 and now trending down, follow  -Avoid nephrotoxic medications, continue to hold PTA Lasix, hydrochlorothiazide, losartan.  -Blood pressure was low normal, hold parameters in place for low-dose amlodipine and propranolol    Type 1 diabetes, 68 years on insulin pump  Diabetic neuropathy, lower extremity  Albuminuria  Followed by Dr. Garcia at, endocrinology, [facility].  -Continue insulin pump protocol, basal rate, with carb counting,   Noted insulin pump injection site changed 3/3/2024.  Patient is able to manage his pump on his own.    -Advance diet as tolerated to modified carb diet.  -Monitor glucoses, adjust insulin regimen accordingly.  -Follow-up with Dr. Garcia as outpatient on hospital discharge.     HFpEF  Hypertension.   PTA on ASA 81 mg,  amlodipine, Hyzaar, furosemide 20 mg as needed for LE edema   -Continue PTA amlodipine with hold parameters in place  - Losartan, HCTZ and furosemide as above given ITALIA.  -Follow-up [facility] as outpatient on hospital discharge  -Mildly dyspneic only with activity after IVF.  Chest x-ray shows mild pulmonary congestion.  Patient on room air.   -Lasix as per nephrology    Obstructive sleep apnea  -Continue CPAP    Chronic anemia  Hb 10-11, suspect underlying CKD.  Denies any history of bleeding including hematochezia or melena  -Monitor     Essential tremor  Patient reports longstanding, on Mysoline and propranolol continue        Diet: Combination Diet Regular  "Diet Adult; Moderate Consistent Carb (60 g CHO per Meal) Diet    DVT Prophylaxis: Pneumatic Compression Devices/heparin SQ  Valentin Catheter: Not present  Lines: None     Cardiac Monitoring: None  Code Status: Full Code      Clinically Significant Risk Factors                           # Obesity: Estimated body mass index is 31.22 kg/m  as calculated from the following:    Height as of this encounter: 1.651 m (5' 5\").    Weight as of this encounter: 85.1 kg (187 lb 9.6 oz)., PRESENT ON ADMISSION            Disposition Plan      Expected Discharge Date: 03/10/2024                    Ortiz Madison MD  Hospitalist Service  [facility]  Securely message with [employer] (more info)  Text page via [employer] Paging/Directory   ______________________________________________________________________    Interval History     Chart reviewed, discussed with nursing staff and patient was seen this morning.  Patient states pain in the abdominal wall  has overall improved since admission although mostly the same in last 1-2 days.  -Dyspnea with activity only.   -Used CPAP overnight.     -afebrile.     Physical Exam   Vital Signs: Temp: 97.7  F (36.5  C) Temp src: Oral BP: 136/70 Pulse: 70   Resp: 18 SpO2: 99 % O2 Device: None (Room air)    Weight: 187 lbs 9.6 oz    General: AAOx3, very pleasant, appears comfortable.  HEENT: PERRLA EOMI. Mucosa moist.   Lungs: Bilateral equal air entry.  Diminished breath sounds, crepitations posteriorly, no wheezing, normal work of breathing. On room air  CVS: S1S2 regular, no tachycardia or murmur.   Abdomen: Soft, distended, indurated tender abdominal wall with erythema not clearly demarcated. Area appears stable in the front but has spread on Lt flank and groin.   Insulin pump and Dexcom attached to abdominal wall few inches oval the area of redness/induration  : penile an scrotal edema. Not tender.   MSK: Legs appear puffy, trace edema present.  Neuro: AAOX3. CN 2-12 normal. " Strength symmetrical.  Skin: No rash. Erythema in the abdominal wall as above, no open wound or drainage      Medical Decision Making       45 MINUTES SPENT BY ME on the date of service doing chart review, history, exam, documentation & further activities per the note.   Discussed with patient, RN, ID consultant    Data     I have personally reviewed the following data over the past 24 hrs:    22.0 (H)  \   10.0 (L)   / 168     139 102 69.7 (H) /  172 (H)   3.6 22 2.58 (H) \     Procal: N/A CRP: 192.83 (H) Lactic Acid: N/A         Imaging results reviewed over the past 24 hrs:   No results found for this or any previous visit (from the past 24 hour(s)).

## 2024-03-07 NOTE — PLAN OF CARE
Goal Outcome Evaluation:       Summary:  Left abdominal cellulitis, sepsis, DM, ITALIA, CHF  DATE & TIME: 03/06/24, 8045-3800   Cognitive Concerns/ Orientation : A&Ox4 - Intermittent confusion   BEHAVIOR & AGGRESSION TOOL COLOR: Green  CIWA SCORE: NA   ABNL VS/O2: VSS on room air. Slept with home CPAP  MOBILITY: Independent in room.   PAIN MANAGMENT: lozenges given for scratchy throat  DIET: Mod carb.   BOWEL/BLADDER: Continent  ABNL LAB/BG:  -   DRAIN/DEVICES: Left PIV SL  TELEMETRY RHYTHM: NA  SKIN: Abdominal cellulitis outlined, left heel wound, dry flaky skin. Insulin pump to abdomen. Patient manages pump  TESTS/PROCEDURES: NA  D/C DAY/GOALS/PLACE: pending  OTHER IMPORTANT INFO: on IV ceftriaxone and clindamycin. MD said okay to documenting in MAR insulin pump NOT GIVEN since patient is oriented and manages it himself. Melatonin given for sleep  NEW INSULIN VIAL needs to be ordered in 3 days 03/08/24 if patient still here. Vials are single use.

## 2024-03-07 NOTE — PROGRESS NOTES
"Nephrology Progress Note  03/07/2024         Assessment & Recommendations:   1  acute renal failure-last known creatinine of 1 in July 2023.  Microalbuminuria around 200 mg/gram in June 2023 (type I DM for more than 65 years)  -Prerenal ITALIA with poor oral intake, losartan/hydrochlorothiazide, regular use of high-dose NSAIDs and SIRS.  Further worsened by IV contrast     CT abdomen shows normal-looking kidneys  Urine sodium of less than 20  Urinalysis shows pyuria, microscopic hematuria, 1+ protein, hyaline casts  Urine sodium less than 20, likely with contrast nephropathy    -Creatinine and urine output improving.  Early recovery     2 abdominal wall cellulitis-on ceftriaxone, clindamycin     3 type 1 diabetes mellitus-on insulin.  Denies history of retinopathy.  Has albuminuria     4 essential hypertension-continue amlodipine.  Hold losartan/hydrochlorothiazide.     Advised against regular use of NSAIDs  Avoid further IV contrast use  Strict I's/O.  Hold off further diuretics  Daily renal panel  Continue supportive management    If creatinine improving tomorrow, can be discharged from renal standpoint    Recommendations were communicated to primary team     April Chapman MD  [facility] - Nephrology   [phone]      Interval History :   Seen / examined.   No acute issues overnight.  Abdominal wall pain is improving  Afebrile  Urine output improving.  Creatinine improving.        Physical Exam:   I/O last 3 completed shifts:  In: 240 [P.O.:240]  Out: 825 [Urine:825]  /62 (BP Location: Left arm)   Pulse 69   Temp 99.7  F (37.6  C) (Oral)   Resp 18   Ht 1.651 m (5' 5\")   Wt 85.1 kg (187 lb 9.6 oz)   SpO2 97%   BMI 31.22 kg/m      GENERAL APPEARANCE: no distress,  awake  Pulmonary: Few crackles at bases  CV: regular rhythm, normal rate, no rub   - JVP -   - Edema-  GI: soft, abdominal wall erythema  MS: no evidence of inflammation in joints  : no fuentes  SKIN: no rash, warm, dry, no " cyanosis  NEURO: face symmetric, no asterixis     Labs:   All labs reviewed by me  Electrolytes/Renal -   Recent Labs   Lab Test 03/07/24  0850 03/07/24  0830 03/07/24  0236 03/06/24  1144 03/06/24  0857 03/05/24  2139 03/05/24  1557   NA  --  139  --   --  135  --  138   POTASSIUM  --  3.6  --   --  3.9  --  4.3   CHLORIDE  --  102  --   --  104  --  105   CO2  --  22  --   --  21*  --  20*   BUN  --  69.7*  --   --  62.1*  --  49.8*   CR  --  2.58*  --   --  2.90*  --  2.69*   * 180* 232*   < > 135*   < > 172*   PILY  --  8.6*  --   --  7.9*  --  7.9*    < > = values in this interval not displayed.       CBC -   Recent Labs   Lab Test 03/07/24  0830 03/06/24  0857 03/05/24  0829   WBC 22.0* 25.9* 22.6*   HGB 10.0* 9.9* 10.9*    170 193       LFTs -   Recent Labs   Lab Test 03/04/24  0652   ALKPHOS 93   BILITOTAL 0.6   ALT 61   AST 78*   PROTTOTAL 6.2*   ALBUMIN 3.7         Current Medications:   amLODIPine  2.5 mg Oral Daily    aspirin  81 mg Oral Daily    cefTRIAXone  2 g Intravenous Q24H    clindamycin  900 mg Intravenous Q8H    heparin ANTICOAGULANT  5,000 Units Subcutaneous Q12H    insulin aspart   Device See Admin Instructions    insulin   Subcutaneous TID AC    insulin   Subcutaneous TID AC    melatonin  3 mg Oral At Bedtime    multivitamin, therapeutic  1 tablet Oral Daily    primidone  100 mg Oral At Bedtime    propranolol ER  60 mg Oral Daily    rosuvastatin  10 mg Oral Daily    sodium chloride (PF)  3 mL Intracatheter Q8H      insulin basal rate for inpatient ambulatory pump 0.6 Units/hr (03/04/24 1336)     April Chapman MD

## 2024-03-08 ENCOUNTER — APPOINTMENT (OUTPATIENT)
Dept: PHYSICAL THERAPY | Facility: CLINIC | Age: 74
DRG: 871 | End: 2024-03-08
Attending: HOSPITALIST
Payer: MEDICARE

## 2024-03-08 LAB
ANION GAP SERPL CALCULATED.3IONS-SCNC: 15 MMOL/L (ref 7–15)
BASOPHILS # BLD AUTO: ABNORMAL 10*3/UL
BASOPHILS # BLD MANUAL: 0.2 10E3/UL (ref 0–0.2)
BASOPHILS NFR BLD AUTO: ABNORMAL %
BASOPHILS NFR BLD MANUAL: 1 %
BUN SERPL-MCNC: 61.9 MG/DL (ref 8–23)
CALCIUM SERPL-MCNC: 8.7 MG/DL (ref 8.8–10.2)
CHLORIDE SERPL-SCNC: 103 MMOL/L (ref 98–107)
CREAT SERPL-MCNC: 2.19 MG/DL (ref 0.67–1.17)
CRP SERPL-MCNC: 137.51 MG/L
DEPRECATED HCO3 PLAS-SCNC: 22 MMOL/L (ref 22–29)
EGFRCR SERPLBLD CKD-EPI 2021: 31 ML/MIN/1.73M2
ELLIPTOCYTES BLD QL SMEAR: ABNORMAL
EOSINOPHIL # BLD AUTO: ABNORMAL 10*3/UL
EOSINOPHIL # BLD MANUAL: 0.6 10E3/UL (ref 0–0.7)
EOSINOPHIL NFR BLD AUTO: ABNORMAL %
EOSINOPHIL NFR BLD MANUAL: 4 %
ERYTHROCYTE [DISTWIDTH] IN BLOOD BY AUTOMATED COUNT: 15.3 % (ref 10–15)
GLUCOSE BLDC GLUCOMTR-MCNC: 136 MG/DL (ref 70–99)
GLUCOSE BLDC GLUCOMTR-MCNC: 139 MG/DL (ref 70–99)
GLUCOSE BLDC GLUCOMTR-MCNC: 141 MG/DL (ref 70–99)
GLUCOSE BLDC GLUCOMTR-MCNC: 143 MG/DL (ref 70–99)
GLUCOSE BLDC GLUCOMTR-MCNC: 96 MG/DL (ref 70–99)
GLUCOSE SERPL-MCNC: 142 MG/DL (ref 70–99)
HCT VFR BLD AUTO: 32.9 % (ref 40–53)
HGB BLD-MCNC: 11 G/DL (ref 13.3–17.7)
IMM GRANULOCYTES # BLD: ABNORMAL 10*3/UL
IMM GRANULOCYTES NFR BLD: ABNORMAL %
LYMPHOCYTES # BLD AUTO: ABNORMAL 10*3/UL
LYMPHOCYTES # BLD MANUAL: 1.6 10E3/UL (ref 0.8–5.3)
LYMPHOCYTES NFR BLD AUTO: ABNORMAL %
LYMPHOCYTES NFR BLD MANUAL: 10 %
MCH RBC QN AUTO: 21.5 PG (ref 26.5–33)
MCHC RBC AUTO-ENTMCNC: 33.4 G/DL (ref 31.5–36.5)
MCV RBC AUTO: 64 FL (ref 78–100)
METAMYELOCYTES # BLD MANUAL: 0.5 10E3/UL
METAMYELOCYTES NFR BLD MANUAL: 3 %
MONOCYTES # BLD AUTO: ABNORMAL 10*3/UL
MONOCYTES # BLD MANUAL: 1.7 10E3/UL (ref 0–1.3)
MONOCYTES NFR BLD AUTO: ABNORMAL %
MONOCYTES NFR BLD MANUAL: 11 %
NEUTROPHILS # BLD AUTO: ABNORMAL 10*3/UL
NEUTROPHILS # BLD MANUAL: 11.1 10E3/UL (ref 1.6–8.3)
NEUTROPHILS NFR BLD AUTO: ABNORMAL %
NEUTROPHILS NFR BLD MANUAL: 71 %
NRBC # BLD AUTO: 0 10E3/UL
NRBC BLD AUTO-RTO: 0 /100
PLAT MORPH BLD: ABNORMAL
PLATELET # BLD AUTO: 163 10E3/UL (ref 150–450)
POTASSIUM SERPL-SCNC: 3.4 MMOL/L (ref 3.4–5.3)
RBC # BLD AUTO: 5.11 10E6/UL (ref 4.4–5.9)
RBC MORPH BLD: ABNORMAL
SODIUM SERPL-SCNC: 140 MMOL/L (ref 135–145)
TARGETS BLD QL SMEAR: SLIGHT
WBC # BLD AUTO: 15.6 10E3/UL (ref 4–11)

## 2024-03-08 PROCEDURE — 36415 COLL VENOUS BLD VENIPUNCTURE: CPT | Performed by: HOSPITALIST

## 2024-03-08 PROCEDURE — 97161 PT EVAL LOW COMPLEX 20 MIN: CPT | Mod: GP

## 2024-03-08 PROCEDURE — 250N000013 HC RX MED GY IP 250 OP 250 PS 637: Performed by: HOSPITALIST

## 2024-03-08 PROCEDURE — 120N000001 HC R&B MED SURG/OB

## 2024-03-08 PROCEDURE — 99232 SBSQ HOSP IP/OBS MODERATE 35: CPT | Performed by: INTERNAL MEDICINE

## 2024-03-08 PROCEDURE — 86140 C-REACTIVE PROTEIN: CPT | Performed by: HOSPITALIST

## 2024-03-08 PROCEDURE — 250N000011 HC RX IP 250 OP 636: Performed by: HOSPITALIST

## 2024-03-08 PROCEDURE — 97530 THERAPEUTIC ACTIVITIES: CPT | Mod: GP

## 2024-03-08 PROCEDURE — 97140 MANUAL THERAPY 1/> REGIONS: CPT | Mod: GP

## 2024-03-08 PROCEDURE — 85027 COMPLETE CBC AUTOMATED: CPT | Performed by: HOSPITALIST

## 2024-03-08 PROCEDURE — 250N000011 HC RX IP 250 OP 636: Performed by: INTERNAL MEDICINE

## 2024-03-08 PROCEDURE — 99232 SBSQ HOSP IP/OBS MODERATE 35: CPT | Performed by: HOSPITALIST

## 2024-03-08 PROCEDURE — 80048 BASIC METABOLIC PNL TOTAL CA: CPT | Performed by: HOSPITALIST

## 2024-03-08 PROCEDURE — 85007 BL SMEAR W/DIFF WBC COUNT: CPT | Performed by: HOSPITALIST

## 2024-03-08 RX ADMIN — PRIMIDONE 100 MG: 50 TABLET ORAL at 21:06

## 2024-03-08 RX ADMIN — ROSUVASTATIN CALCIUM 10 MG: 10 TABLET, FILM COATED ORAL at 08:36

## 2024-03-08 RX ADMIN — CLINDAMYCIN PHOSPHATE 900 MG: 900 INJECTION, SOLUTION INTRAVENOUS at 11:26

## 2024-03-08 RX ADMIN — HEPARIN SODIUM 5000 UNITS: 5000 INJECTION, SOLUTION INTRAVENOUS; SUBCUTANEOUS at 21:06

## 2024-03-08 RX ADMIN — MULTIVITAMIN TABLET 1 TABLET: TABLET at 08:36

## 2024-03-08 RX ADMIN — CLINDAMYCIN PHOSPHATE 900 MG: 900 INJECTION, SOLUTION INTRAVENOUS at 03:05

## 2024-03-08 RX ADMIN — CEFTRIAXONE SODIUM 2 G: 2 INJECTION, POWDER, FOR SOLUTION INTRAMUSCULAR; INTRAVENOUS at 10:52

## 2024-03-08 RX ADMIN — HEPARIN SODIUM 5000 UNITS: 5000 INJECTION, SOLUTION INTRAVENOUS; SUBCUTANEOUS at 10:54

## 2024-03-08 RX ADMIN — CLINDAMYCIN PHOSPHATE 900 MG: 900 INJECTION, SOLUTION INTRAVENOUS at 18:19

## 2024-03-08 RX ADMIN — MELATONIN TAB 3 MG 3 MG: 3 TAB at 21:06

## 2024-03-08 RX ADMIN — ASPIRIN 81 MG: 81 TABLET, COATED ORAL at 08:36

## 2024-03-08 ASSESSMENT — ACTIVITIES OF DAILY LIVING (ADL)
ADLS_ACUITY_SCORE: 26

## 2024-03-08 NOTE — PLAN OF CARE
Goal Outcome Evaluation:  Summary:  Left abdominal cellulitis, sepsis, DM, ITALIA, CHF  DATE & TIME: 03/08/24, 2444-4136   Cognitive Concerns/ Orientation : A&Ox4 - Intermittent confusion   BEHAVIOR & AGGRESSION TOOL COLOR: Green  CIWA SCORE: NA   ABNL VS/O2: VSS on room air. Has own CPAP machine   MOBILITY: Independent in room.   PAIN MANAGMENT: Denied  DIET: Mod carb. Good appetite  BOWEL/BLADDER: Continent  ABNL LAB/BG: WBC 15.6, Cr 2.19, .51, Hgb 11.0, /151 DRAIN/DEVICES: Left PIV SL, pt has his own insulin pump & BG checking device  TELEMETRY RHYTHM: NA  SKIN: Abdominal cellulitis outlined improving, left heel wound, dry flaky skin. Insulin pump to abdomen. Patient manages pump. Scrotal swelling managed today by lymphedema therapy.   TESTS/PROCEDURES: NA  D/C DAY/GOALS/PLACE: pending  OTHER IMPORTANT INFO: on IV ceftriaxone and clindamycin. MD said okay to documenting in MAR insulin NOT GIVEN since patient is oriented and manages it himself. Wife brought refill of insulin pump for patient today, patient will refill.

## 2024-03-08 NOTE — PROGRESS NOTES
"Nephrology Progress Note  03/08/2024         Assessment & Recommendations:   1  acute renal failure-last known creatinine of 1 in July 2023.  Microalbuminuria around 200 mg/gram in June 2023 (type I DM for more than 65 years)  -Prerenal ITALIA with poor oral intake, losartan/hydrochlorothiazide, regular use of high-dose NSAIDs and SIRS.  Further worsened by IV contrast     CT abdomen shows normal-looking kidneys  Urinalysis shows pyuria, microscopic hematuria, 1+ protein, hyaline casts  Urine sodium less than 20, likely with contrast nephropathy    -Creatinine and urine output improving.  Early recovery     2 abdominal wall cellulitis-on ceftriaxone, clindamycin     3 type 1 diabetes mellitus-on insulin.  Denies history of retinopathy.  Has albuminuria     4 essential hypertension-continue amlodipine.  Hold losartan/hydrochlorothiazide.     Advised against regular use of NSAIDs  Avoid further IV contrast use  Daily renal panel  Continue supportive management    He is volume overloaded but expect him to autodiurese with resolving ITALIA. Can use Lasix PRN if needed .     Will sign off. Follow up with me in [facility] clinic on 3/22 at 10 AM for post hosp follow up .     Will sign off. Plz call with questions.       April Chapman MD  [facility] - Nephrology   [phone]      Interval History :   Seen / examined.   No acute issues overnight.  Abdominal wall pain is improving  Afebrile   ml  Cr trending down .       Physical Exam:   I/O last 3 completed shifts:  In: -   Out: 650 [Urine:650]  /62 (BP Location: Right arm)   Pulse 57   Temp 99.4  F (37.4  C) (Oral)   Resp 18   Ht 1.651 m (5' 5\")   Wt 83.1 kg (183 lb 4.8 oz)   SpO2 98%   BMI 30.50 kg/m      GENERAL APPEARANCE: no distress,  awake  Pulmonary: Few crackles at bases  CV: regular rhythm, normal rate, no rub   - JVP -   - Edema-  GI: soft, abdominal wall erythema  MS: no evidence of inflammation in joints  : no fuentes  SKIN: no rash, " warm, dry, no cyanosis  NEURO: face symmetric, no asterixis     Labs:   All labs reviewed by me  Electrolytes/Renal -   Recent Labs   Lab Test 03/08/24  1206 03/08/24  0900 03/08/24  0743 03/07/24  0850 03/07/24  0830 03/06/24  1144 03/06/24  0857   NA  --  140  --   --  139  --  135   POTASSIUM  --  3.4  --   --  3.6  --  3.9   CHLORIDE  --  103  --   --  102  --  104   CO2  --  22  --   --  22  --  21*   BUN  --  61.9*  --   --  69.7*  --  62.1*   CR  --  2.19*  --   --  2.58*  --  2.90*   * 142* 136*   < > 180*   < > 135*   PILY  --  8.7*  --   --  8.6*  --  7.9*    < > = values in this interval not displayed.       CBC -   Recent Labs   Lab Test 03/08/24  0900 03/07/24  0830 03/06/24  0857   WBC 15.6* 22.0* 25.9*   HGB 11.0* 10.0* 9.9*    168 170       LFTs -   Recent Labs   Lab Test 03/04/24  0652   ALKPHOS 93   BILITOTAL 0.6   ALT 61   AST 78*   PROTTOTAL 6.2*   ALBUMIN 3.7         Current Medications:    amLODIPine  2.5 mg Oral Daily     aspirin  81 mg Oral Daily     cefTRIAXone  2 g Intravenous Q24H     clindamycin  900 mg Intravenous Q8H     heparin ANTICOAGULANT  5,000 Units Subcutaneous Q12H     insulin aspart   Device See Admin Instructions     insulin   Subcutaneous TID AC     insulin   Subcutaneous TID AC     melatonin  3 mg Oral At Bedtime     multivitamin, therapeutic  1 tablet Oral Daily     primidone  100 mg Oral At Bedtime     propranolol ER  60 mg Oral Daily     rosuvastatin  10 mg Oral Daily     sodium chloride (PF)  3 mL Intracatheter Q8H       insulin basal rate for inpatient ambulatory pump 0.6 Units/hr (03/04/24 1336)     April Chapman MD

## 2024-03-08 NOTE — PROGRESS NOTES
Orientations: A/OX4.   Vitals/Pain: VSS on room air  Lines/Drains: 1PIV, insulin pump in place  Skin/Wounds: abdominal wall cellulitis extending to left flank, red and warm to the touch   GI/: continent of B&B, BMX1  Labs: Abnormal/Trends, Electrolyte Replacement- .83 WBC 22  Ambulation/Assist: independent in the room  Plan: Continue IV antibiotics

## 2024-03-08 NOTE — PLAN OF CARE
Summary:  Left abdominal cellulitis, sepsis, DM, ITALIA, CHF  DATE & TIME: 03/07/24, 6668-6392   Cognitive Concerns/ Orientation : A&Ox4 - Intermittent confusion   BEHAVIOR & AGGRESSION TOOL COLOR: Green  CIWA SCORE: NA   ABNL VS/O2: VSS on room air. Slept with home CPAP  MOBILITY: Independent in room.   PAIN MANAGMENT: PRN oxy given for pain-some relief.   DIET: Mod carb.   BOWEL/BLADDER: Continent  ABNL LAB/BG: . 96  DRAIN/DEVICES: Left PIV SL, pt has his own insulin pump  TELEMETRY RHYTHM: NA  SKIN: Abdominal cellulitis outlined, left heel wound, dry flaky skin. Insulin pump to abdomen. Patient manages pump  TESTS/PROCEDURES: NA  D/C DAY/GOALS/PLACE: pending  OTHER IMPORTANT INFO: on IV ceftriaxone and clindamycin. MD said okay to documenting in MAR insulin pump NOT GIVEN since patient is oriented and manages it himself. Melatonin given for sleep  NEW INSULIN VIAL needs to be ordered in 3 days 03/08/24 if patient still here. Vials are single use. Pharmacist is aware; will have it filled in the AM shift.

## 2024-03-08 NOTE — PROGRESS NOTES
[facility]    Infectious Disease Progress Note    Date of Service (when I saw the patient): 03/08/2024     Assessment & Plan   Enrique Long is a 73 year old who was admitted on 3/4/2024.     Impression:  73-year-old male with past medical history of diabetes  CHF  Admitted with abdominal pain that started after switching site of omnipod along with local erythema  Also found to be in ITALIA this is slowly improving  Started on Vanco and Zosyn initially  Blood cultures done they are pending  CT abdomen with left abdominal skin thickening and subcutaneous tissue stranding consistent with cellulitis  Persistent leukocytosis, continued elevation in the creatinine  Patient is afebrile now     Recommendations:  No positive micro    Very streptococcal appearing cellulitis with blistering continue on ceftriaxone and clindamycin, now that the creatinine is improving WBC also improving       Herminio Duong MD    Interval History   Tolerating antibiotics ok   No new rashes or issues with antibiotics   Labs reviewed   No changes to past medical, social or family history   Fever is down  Creatinine up still again  WBC improved   Feeling better     Physical Exam   Temp: 99.4  F (37.4  C) Temp src: Oral BP: 129/62 Pulse: 57   Resp: 18 SpO2: 98 % O2 Device: None (Room air)    Vitals:    03/04/24 0618 03/06/24 0700 03/08/24 0647   Weight: 77.1 kg (170 lb) 85.1 kg (187 lb 9.6 oz) 83.1 kg (183 lb 4.8 oz)     Vital Signs with Ranges  Temp:  [97.4  F (36.3  C)-99.7  F (37.6  C)] 99.4  F (37.4  C)  Pulse:  [57-69] 57  Resp:  [18] 18  BP: (117-139)/(55-62) 129/62  SpO2:  [96 %-98 %] 98 %    Constitutional: Awake, alert, cooperative, no apparent distress  Lungs: Clear to auscultation bilaterally, no crackles or wheezing  Cardiovascular: Regular rate and rhythm, normal S1 and S2, and no murmur noted  Abdomen: Left abdomen with redness and blistering is improved on exam today   Skin: No rashes, no cyanosis, no  "edema  Other:    Medications    insulin basal rate for inpatient ambulatory pump 0.6 Units/hr (03/04/24 1336)      amLODIPine  2.5 mg Oral Daily    aspirin  81 mg Oral Daily    cefTRIAXone  2 g Intravenous Q24H    clindamycin  900 mg Intravenous Q8H    heparin ANTICOAGULANT  5,000 Units Subcutaneous Q12H    insulin aspart   Device See Admin Instructions    insulin   Subcutaneous TID AC    insulin   Subcutaneous TID AC    melatonin  3 mg Oral At Bedtime    multivitamin, therapeutic  1 tablet Oral Daily    primidone  100 mg Oral At Bedtime    propranolol ER  60 mg Oral Daily    rosuvastatin  10 mg Oral Daily    sodium chloride (PF)  3 mL Intracatheter Q8H       Data   All microbiology laboratory data reviewed.  Recent Labs   Lab Test 03/07/24  0830 03/06/24  0857 03/05/24  0829   WBC 22.0* 25.9* 22.6*   HGB 10.0* 9.9* 10.9*   HCT 29.4* 30.2* 33.4*   MCV 64* 66* 68*    170 193     Recent Labs   Lab Test 03/07/24  0830 03/06/24  0857 03/05/24  1557   CR 2.58* 2.90* 2.69*     No lab results found.  No lab results found.    Invalid input(s): \"UC\"    All cultures:  Recent Labs   Lab 03/05/24  1117 03/04/24  0725 03/04/24  0652   CULTURE No Growth No growth after 3 days No growth after 3 days      Blood culture:  Results for orders placed or performed during the hospital encounter of 03/04/24   Blood Culture Peripheral Blood    Specimen: Peripheral Blood   Result Value Ref Range    Culture No growth after 3 days    Blood Culture Peripheral Blood    Specimen: Peripheral Blood   Result Value Ref Range    Culture No growth after 3 days       Urine culture:  Results for orders placed or performed during the hospital encounter of 03/04/24   Urine Culture    Specimen: Urine, Midstream   Result Value Ref Range    Culture No Growth              "

## 2024-03-08 NOTE — PROGRESS NOTES
"   03/08/24 7421   Appointment Info   Signing Clinician's Name / Credentials (PT) Karolina Galdamez, PT, DPT   Quick Adds   Quick Adds Edema Eval   Living Environment   People in Home spouse   Current Living Arrangements house   Home Accessibility stairs to enter home   Number of Stairs, Main Entrance 1   Transportation Anticipated car, drives self;family or friend will provide   Self-Care   Usual Activity Tolerance good   Current Activity Tolerance moderate   Regular Exercise Yes   Activity/Exercise Type   (walking, tries to get 6-8k steps in a day)   Equipment Currently Used at Home none   Fall history within last six months no   Activity/Exercise/Self-Care Comment Independent at baseline, no device   General Information   Onset of Illness/Injury or Date of Surgery 03/04/24   Referring Physician Ortiz Madison MD   Patient/Family Therapy Goals Statement (PT) to get better   Pertinent History of Current Problem (include personal factors and/or comorbidities that impact the POC) \"Enrique Long is a 73 year old male PMH followed by [facility]  diabetes type 1 with insulin pump, HFpEF presents to the ED with severe abdominal pain with erythema at insulin pump site onset yesterday \"   Existing Precautions/Restrictions fall   Weight-Bearing Status - LLE full weight-bearing   Weight-Bearing Status - RLE full weight-bearing   Cognition   Affect/Mental Status (Cognition) WNL   Pain Assessment   Patient Currently in Pain Yes, see Vital Sign flowsheet  (abdomen, scrotum)   Integumentary/Edema   Integumentary/Edema other (describe)   Onset of Edema 03/03/24   Affected Body Part(s) Other (see comments)   Edema Etiology Infection   Etiology Comments Primarily affected in abdomen and scrotum   Edema Precautions Acute infection;Renal Insufficiency   Edema Precaution Comments \"Also found to be in ITALIA\"- creatinine 2.19 today, trending downward since admission.   General Comments/Previous Edema Treatment/Edema Equipment none "   Edema Examination/Assessment   Skin Condition Pitting   Skin Condition Comments 1+ pitting in B LE shins, otherwise legs are shapely with minimal edema. Abdomen is red and distended, redness noted to be slightly smaller than markings. One small spot of flaking skin on abdomen. Pt reports scrotal edema as well, scrotum noted to be about size of a kiwi, not overtly swollen but uncomfortable. Penis not inverted, possible swelling as well.   Stemmer Sign Negative   Posture    Posture Not impaired   Range of Motion (ROM)   Range of Motion ROM is WFL   Strength (Manual Muscle Testing)   Strength (Manual Muscle Testing) strength is WFL   Bed Mobility   Bed Mobility no deficits identified   Comment, (Bed Mobility) slow, some pain, but independent   Transfers   Transfers no deficits identified   Comment, (Transfers) independent w/ most chairs- recliner chair in his room is oddly tall and difficult to get into   Gait/Stairs (Locomotion)   C[place] (Gait) independent   Comment, (Gait/Stairs) independent without device, steady.   Balance   Balance no deficits were identified   Sensory Examination   Sensory Perception patient reports no sensory changes   Clinical Impression   Criteria for Skilled Therapeutic Intervention Yes, treatment indicated   PT Diagnosis (PT) impaired skin integrity   Edema: Patient Presentation Other (see comments);Edema  (scrotal edema)   Influenced by the following impairments skin integrity and edema   Functional limitations due to impairments functional mobility, self cares   Clinical Presentation (PT Evaluation Complexity) stable   Clinical Presentation Rationale clinical judgement   Clinical Decision Making (Complexity) low complexity   Planned Therapy Interventions (PT) home exercise program;patient/family education;manual therapy techniques   Edema: Planned Interventions Gradient compression bandaging;Edema exercises;Precautions to prevent infection/exacerbation;Education;Manual  therapy   Risk & Benefits of therapy have been explained evaluation/treatment results reviewed;care plan/treatment goals reviewed;risks/benefits reviewed;current/potential barriers reviewed;participants voiced agreement with care plan;participants included;patient   PT Total Evaluation Time   PT Eval, Low Complexity Minutes (88036) 12   Physical Therapy Goals   PT Frequency Daily   PT Predicted Duration/Target Date for Goal Attainment 03/15/24   PT Goals Edema   PT: Edema education to increase ability to manage edema after discharge from the hospital Patient;Caregiver;Verbalize;Demonstrate;Skin care routine;signs/symptoms of intolerance;wear schedule;limb positioning;discharge recommendations;[place]   PT: Management of edema bandages Patient;Caregiver;Verbalize;Demonstrate;[place];garment(s)   PT: Functional edema exercise program to reduce limb volume, increase activity tolerance and improve independence with ADL Patient;Caregiver;Verbalize;Demonstrates;[place];HEP;walking program   Interventions   Interventions Quick Adds Manual Therapy;Therapeutic Activity   Therapeutic Activity   Therapeutic Activities: dynamic activities to improve functional performance Minutes (37850) 15   Symptoms Noted During/After Treatment None   Treatment Detail/Skilled Intervention Bed mobility and transfers and ambulation independent without device, painful to get in and out of bed with abdominal pain. Able to get into tall chair with pillows for cushioning, realistically this chair is too tall. Education on lymphedema, on elevating legs, walking program, and ankle pumps, all questions answered.   Manual Therapy   Manual Therapy Minutes (83614) 17   Symptoms Noted During/After Treatment None   Treatment Detail/Skilled Intervention Due to discomfort in scrotum, donned scrotal cap of TG soft (size small)- first layer placed around scrotum first, then twisted fabric to don second layer. Also elevated scrotum on a few lays  of towels. Pt comfortable, understands he can and should take off the scrotal cap if it is uncomfortable. Pt educated on purpose of this and set of for patient to be able to set this up himself.   PT Discharge Planning   PT Plan check in w/ scrotal edema set up and change if necessary. review edema education   PT Discharge Recommendation (DC Rec) home   PT Rationale for DC Rec Patient's mobility is independent, but affected by pain in abdomen. At time of eval patient set up with scrotum cap and elevation to address some of the edema he's having. Will continue to follow for the edema.   PT Brief overview of current status independent without device   Total Session Time   Timed Code Treatment Minutes 32   Total Session Time (sum of timed and untimed services) 44

## 2024-03-08 NOTE — PROGRESS NOTES
[facility]    Medicine Progress Note - Hospitalist Service    Date of Admission:  3/4/2024    Assessment & Plan     Enrique Long is a 73 year old male PMH followed by [facility]  diabetes type 1 with insulin pump, HFpEF presents to the ED with severe abdominal pain with erythema at insulin pump site onset yesterday      Abdominal wall cellulitis at insulin pump site  Sepsis secondary to above  Enrique Long is a 73 year old male with a past medical history significant for diabetes type 1, CHF who presents to the ED via/accompanied by wife with a chief complaint of severe abdominal pain onset yesterday after switching the site of the OmniPod with associated erythema surrounding site.    In ED, afebrile, WBC 21.4 K, CRP 53.52.  LA 3.4, CR 1.52, normal BUN, random glucose 198, CT abdomen left skin thickening and subcu soft tissue stranding, no abscess or foreign body appreciated.  Soft tissue ultrasound pending.  Started on Zosyn and Vanco.  Given LR 1.5 L bolus.     -Was started on vancomycin and Zosyn, MRSA nasal swab negative, ID consulted, vancomycin was changed to daptomycin initially, subsequently stopped and now continuing with Rocephin and clindamycin given suspected cellulitis due to Streptococcus. Appreciate ID assistance.    - Lactic acid normalized. BC- NGTD, leucocytosis is improving 21.4-->22.6-->25.9-->22-->15.6, CRP 53-->251-->192-->137. Follow  -Abdominal wall ultrasound shows soft tissue swelling but no fluid collection. CT as above  -Optimize glycemic control, continue insulin pump protocol.   -Continue pain management as ordered      Acute kidney injury  Anion gap metabolic acidosis, lactic acidosis  Last CR on record 7/2023 at [facility] 1.0.  Patient presented with a creatinine of 1.52.  Lactic acid 3.4.  -Creatinine trended up 1.5 to--> 2.66--> 2.69-->2.9-->2.58-->2.19  -ITALIA is multifactorial :- reports taking Aleve for pain, he is on Lasix, hydrochlorothiazide,  losartan PTA.  Patient also received contrast for CT scan on admission.  Patient has sepsis due to cellulitis.  -Bolus and maintenance IVF ordered and nephrology consulted.  Patient subsequently had dyspnea after IVF bolus and chest x-ray shows mild pulmonary congestion but on room air.  IVF discontinued.   - UA has 19 WBCs, leukocyte esterase, RBCs, hyaline cast and amorphous crystals.  - Holding PTA meds including HCTZ, Lasix, losartan.   - Cr likely peaked at 2.9 and now trending down, as above  -Avoid nephrotoxic medications, continue to hold PTA Lasix, hydrochlorothiazide, losartan.  -Blood pressure was low normal, hold parameters in place for low-dose amlodipine and propranolol    Type 1 diabetes, 68 years on insulin pump  Diabetic neuropathy, lower extremity  Albuminuria  Followed by Dr. Garcia at, endocrinology, [facility].  -Continue insulin pump protocol, basal rate, with carb counting,   Noted insulin pump injection site changed 3/3/2024.  Patient is able to manage his pump on his own.    -Advance diet as tolerated to modified carb diet.  -Monitor glucoses, adjust insulin regimen accordingly.  -Follow-up with Dr. Garcia as outpatient on hospital discharge.     HFpEF  Hypertension.   PTA on ASA 81 mg,  amlodipine, Hyzaar, furosemide 20 mg as needed for LE edema   -Continue PTA amlodipine with hold parameters in place  - Losartan, HCTZ and furosemide as above given ITALIA.  -Follow-up [facility] as outpatient on hospital discharge  -Mildly dyspneic only with activity after IVF.  Chest x-ray shows mild pulmonary congestion.  Patient on room air.   -Lasix PRN or as per nephrology    Obstructive sleep apnea  -Continue CPAP    Chronic anemia  Hb 10-11, suspect underlying CKD.  Denies any history of bleeding including hematochezia or melena  -Monitor     Essential tremor  Patient reports longstanding, on Mysoline and propranolol continue    Scrotal swelling  Continuation from abd, likely dependent edema.  "Non tender.  -Elevate         Diet: Combination Diet Regular Diet Adult; Moderate Consistent Carb (60 g CHO per Meal) Diet    DVT Prophylaxis: Pneumatic Compression Devices/heparin SQ  Valentin Catheter: Not present  Lines: None     Cardiac Monitoring: None  Code Status: Full Code      Clinically Significant Risk Factors                           # Obesity: Estimated body mass index is 30.5 kg/m  as calculated from the following:    Height as of this encounter: 1.651 m (5' 5\").    Weight as of this encounter: 83.1 kg (183 lb 4.8 oz).             Disposition Plan     Expected Discharge Date: 03/10/2024            anticipate 2-3 more days of IV abx. PT eval pending.          Ortiz Madison MD  Hospitalist Service  [facility]  Securely message with [employer] (more info)  Text page via [employer] Paging/Directory   ______________________________________________________________________    Interval History     Chart reviewed, discussed with nursing staff and patient was seen this morning.  Feels tired otherwise no acute issues reported. Overall pain over the abd wall improved although not resolved.   Afebrile  No dyspnea at rest  Swollen scrotum and penis unchanged, discussed with patient and RN about scrotal elevation. Denies hesitancy with urination.        Physical Exam   Vital Signs: Temp: 99.4  F (37.4  C) Temp src: Oral BP: 129/62 Pulse: 57   Resp: 18 SpO2: 98 % O2 Device: None (Room air)    Weight: 183 lbs 4.8 oz    General: AAOx3, very pleasant, appears comfortable.  HEENT: PERRLA EOMI. Mucosa moist.   Lungs: Bilateral equal air entry.  Diminished breath sounds, crepitations posteriorly, no wheezing, normal work of breathing. On room air  CVS: S1S2 regular, no tachycardia or murmur.   Abdomen: Soft, distended, indurated tender abdominal wall with erythema not clearly demarcated. Area appears stable in the front but has spread on Lt flank and groin.   Insulin pump and Dexcom attached to abdominal " wall few inches oval the area of redness/induration  : penile an scrotal edema stable. Not tender.   MSK: Legs appear puffy, trace edema present.  Neuro: AAOX3. CN 2-12 normal. Strength symmetrical.  Skin: No rash. Erythema in the abdominal wall as above, no open wound or drainage      Medical Decision Making       40 MINUTES SPENT BY ME on the date of service doing chart review, history, exam, documentation & further activities per the note.   Discussed with patient, RN, ID and nephrology consultant, will update spouse when available    Data         Imaging results reviewed over the past 24 hrs:   No results found for this or any previous visit (from the past 24 hour(s)).

## 2024-03-09 ENCOUNTER — APPOINTMENT (OUTPATIENT)
Dept: PHYSICAL THERAPY | Facility: CLINIC | Age: 74
DRG: 871 | End: 2024-03-09
Payer: MEDICARE

## 2024-03-09 LAB
ANION GAP SERPL CALCULATED.3IONS-SCNC: 9 MMOL/L (ref 7–15)
BACTERIA BLD CULT: NO GROWTH
BACTERIA BLD CULT: NO GROWTH
BASOPHILS # BLD AUTO: ABNORMAL 10*3/UL
BASOPHILS # BLD MANUAL: 0 10E3/UL (ref 0–0.2)
BASOPHILS NFR BLD AUTO: ABNORMAL %
BASOPHILS NFR BLD MANUAL: 0 %
BUN SERPL-MCNC: 35 MG/DL (ref 8–23)
CALCIUM SERPL-MCNC: 8.1 MG/DL (ref 8.8–10.2)
CHLORIDE SERPL-SCNC: 105 MMOL/L (ref 98–107)
CREAT SERPL-MCNC: 1.34 MG/DL (ref 0.67–1.17)
DEPRECATED HCO3 PLAS-SCNC: 26 MMOL/L (ref 22–29)
EGFRCR SERPLBLD CKD-EPI 2021: 55 ML/MIN/1.73M2
ELLIPTOCYTES BLD QL SMEAR: ABNORMAL
EOSINOPHIL # BLD AUTO: ABNORMAL 10*3/UL
EOSINOPHIL # BLD MANUAL: 0.6 10E3/UL (ref 0–0.7)
EOSINOPHIL NFR BLD AUTO: ABNORMAL %
EOSINOPHIL NFR BLD MANUAL: 4 %
ERYTHROCYTE [DISTWIDTH] IN BLOOD BY AUTOMATED COUNT: 15.1 % (ref 10–15)
GLUCOSE BLDC GLUCOMTR-MCNC: 114 MG/DL (ref 70–99)
GLUCOSE BLDC GLUCOMTR-MCNC: 121 MG/DL (ref 70–99)
GLUCOSE BLDC GLUCOMTR-MCNC: 123 MG/DL (ref 70–99)
GLUCOSE BLDC GLUCOMTR-MCNC: 143 MG/DL (ref 70–99)
GLUCOSE BLDC GLUCOMTR-MCNC: 98 MG/DL (ref 70–99)
GLUCOSE SERPL-MCNC: 135 MG/DL (ref 70–99)
HCT VFR BLD AUTO: 27.4 % (ref 40–53)
HGB BLD-MCNC: 9.3 G/DL (ref 13.3–17.7)
IMM GRANULOCYTES # BLD: ABNORMAL 10*3/UL
IMM GRANULOCYTES NFR BLD: ABNORMAL %
LYMPHOCYTES # BLD AUTO: ABNORMAL 10*3/UL
LYMPHOCYTES # BLD MANUAL: 1.8 10E3/UL (ref 0.8–5.3)
LYMPHOCYTES NFR BLD AUTO: ABNORMAL %
LYMPHOCYTES NFR BLD MANUAL: 12 %
MCH RBC QN AUTO: 21.7 PG (ref 26.5–33)
MCHC RBC AUTO-ENTMCNC: 33.9 G/DL (ref 31.5–36.5)
MCV RBC AUTO: 64 FL (ref 78–100)
METAMYELOCYTES # BLD MANUAL: 0.6 10E3/UL
METAMYELOCYTES NFR BLD MANUAL: 4 %
MONOCYTES # BLD AUTO: ABNORMAL 10*3/UL
MONOCYTES # BLD MANUAL: 1.2 10E3/UL (ref 0–1.3)
MONOCYTES NFR BLD AUTO: ABNORMAL %
MONOCYTES NFR BLD MANUAL: 8 %
MYELOCYTES # BLD MANUAL: 0.2 10E3/UL
MYELOCYTES NFR BLD MANUAL: 1 %
NEUTROPHILS # BLD AUTO: ABNORMAL 10*3/UL
NEUTROPHILS # BLD MANUAL: 10.9 10E3/UL (ref 1.6–8.3)
NEUTROPHILS NFR BLD AUTO: ABNORMAL %
NEUTROPHILS NFR BLD MANUAL: 71 %
NRBC # BLD AUTO: 0 10E3/UL
NRBC BLD AUTO-RTO: 0 /100
PLAT MORPH BLD: ABNORMAL
PLATELET # BLD AUTO: 196 10E3/UL (ref 150–450)
POTASSIUM SERPL-SCNC: 3.5 MMOL/L (ref 3.4–5.3)
RBC # BLD AUTO: 4.29 10E6/UL (ref 4.4–5.9)
RBC MORPH BLD: ABNORMAL
SODIUM SERPL-SCNC: 140 MMOL/L (ref 135–145)
TARGETS BLD QL SMEAR: SLIGHT
WBC # BLD AUTO: 15.3 10E3/UL (ref 4–11)

## 2024-03-09 PROCEDURE — 99232 SBSQ HOSP IP/OBS MODERATE 35: CPT | Performed by: SPECIALIST

## 2024-03-09 PROCEDURE — 80048 BASIC METABOLIC PNL TOTAL CA: CPT | Performed by: HOSPITALIST

## 2024-03-09 PROCEDURE — 250N000013 HC RX MED GY IP 250 OP 250 PS 637: Performed by: HOSPITALIST

## 2024-03-09 PROCEDURE — 250N000011 HC RX IP 250 OP 636: Performed by: INTERNAL MEDICINE

## 2024-03-09 PROCEDURE — 97140 MANUAL THERAPY 1/> REGIONS: CPT | Mod: GP | Performed by: PHYSICAL THERAPIST

## 2024-03-09 PROCEDURE — 99232 SBSQ HOSP IP/OBS MODERATE 35: CPT | Performed by: HOSPITALIST

## 2024-03-09 PROCEDURE — 85027 COMPLETE CBC AUTOMATED: CPT | Performed by: HOSPITALIST

## 2024-03-09 PROCEDURE — 36415 COLL VENOUS BLD VENIPUNCTURE: CPT | Performed by: HOSPITALIST

## 2024-03-09 PROCEDURE — 250N000011 HC RX IP 250 OP 636: Performed by: HOSPITALIST

## 2024-03-09 PROCEDURE — 120N000001 HC R&B MED SURG/OB

## 2024-03-09 PROCEDURE — 85007 BL SMEAR W/DIFF WBC COUNT: CPT | Performed by: HOSPITALIST

## 2024-03-09 RX ORDER — AMLODIPINE BESYLATE 5 MG/1
5 TABLET ORAL DAILY
Status: DISCONTINUED | OUTPATIENT
Start: 2024-03-10 | End: 2024-03-10

## 2024-03-09 RX ORDER — SACCHAROMYCES BOULARDII 250 MG
250 CAPSULE ORAL 2 TIMES DAILY
Status: DISCONTINUED | OUTPATIENT
Start: 2024-03-09 | End: 2024-03-10 | Stop reason: HOSPADM

## 2024-03-09 RX ADMIN — HEPARIN SODIUM 5000 UNITS: 5000 INJECTION, SOLUTION INTRAVENOUS; SUBCUTANEOUS at 10:49

## 2024-03-09 RX ADMIN — ROSUVASTATIN CALCIUM 10 MG: 10 TABLET, FILM COATED ORAL at 08:47

## 2024-03-09 RX ADMIN — MELATONIN TAB 3 MG 3 MG: 3 TAB at 21:09

## 2024-03-09 RX ADMIN — AMLODIPINE BESYLATE 2.5 MG: 2.5 TABLET ORAL at 08:46

## 2024-03-09 RX ADMIN — CLINDAMYCIN PHOSPHATE 900 MG: 900 INJECTION, SOLUTION INTRAVENOUS at 18:12

## 2024-03-09 RX ADMIN — ASPIRIN 81 MG: 81 TABLET, COATED ORAL at 08:46

## 2024-03-09 RX ADMIN — MULTIVITAMIN TABLET 1 TABLET: TABLET at 08:46

## 2024-03-09 RX ADMIN — HEPARIN SODIUM 5000 UNITS: 5000 INJECTION, SOLUTION INTRAVENOUS; SUBCUTANEOUS at 21:09

## 2024-03-09 RX ADMIN — CEFTRIAXONE SODIUM 2 G: 2 INJECTION, POWDER, FOR SOLUTION INTRAMUSCULAR; INTRAVENOUS at 10:48

## 2024-03-09 RX ADMIN — PRIMIDONE 100 MG: 50 TABLET ORAL at 21:09

## 2024-03-09 RX ADMIN — CLINDAMYCIN PHOSPHATE 900 MG: 900 INJECTION, SOLUTION INTRAVENOUS at 04:23

## 2024-03-09 RX ADMIN — PROPRANOLOL HYDROCHLORIDE 60 MG: 60 CAPSULE, EXTENDED RELEASE ORAL at 08:46

## 2024-03-09 RX ADMIN — CLINDAMYCIN PHOSPHATE 900 MG: 900 INJECTION, SOLUTION INTRAVENOUS at 10:23

## 2024-03-09 RX ADMIN — Medication 250 MG: at 21:09

## 2024-03-09 ASSESSMENT — ACTIVITIES OF DAILY LIVING (ADL)
ADLS_ACUITY_SCORE: 26

## 2024-03-09 NOTE — PROGRESS NOTES
[facility]    Infectious Disease Progress Note    Date of Service : 03/09/2024     Assessment:  73-year-old male with past medical history of diabetes  CHF  Admitted with abdominal pain that started after switching site of omnipod along with local erythema  Also found to be in ITALIA this is slowly improving  Started on Vanco and Zosyn initially  Blood cultures done they are pending  CT abdomen with left abdominal skin thickening and subcutaneous tissue stranding consistent with cellulitis  Persistent leukocytosis, continued elevation in the creatinine  Patient is afebrile now    Recommendations:  Continue Ceftriaxone and Clindamycin  Likely transition to oral Augmentin tomorrow if continues to improve        Klarissa Hutton MD    Interval History   Feels better, wants to go home. Abdominal wall cellulitis is improving still quite erythematous    Physical Exam   Temp: 99  F (37.2  C) Temp src: Oral BP: (!) 140/63 Pulse: 65   Resp: 20 SpO2: 98 % O2 Device: None (Room air)    Vitals:    03/04/24 0618 03/06/24 0700 03/08/24 0647   Weight: 77.1 kg (170 lb) 85.1 kg (187 lb 9.6 oz) 83.1 kg (183 lb 4.8 oz)     Vital Signs with Ranges  Temp:  [98  F (36.7  C)-99  F (37.2  C)] 99  F (37.2  C)  Pulse:  [65-70] 65  Resp:  [18-20] 20  BP: (140-151)/(61-63) 140/63  SpO2:  [97 %-98 %] 98 %    Constitutional: Awake, alert, cooperative, no apparent distress  Lungs: Clear to auscultation bilaterally, no crackles or wheezing  Abdomen: abdominal wall cellulitis improving    Other:    Medications    insulin basal rate for inpatient ambulatory pump 0.6 Units/hr (03/04/24 1336)      [START ON 3/10/2024] amLODIPine  5 mg Oral Daily    aspirin  81 mg Oral Daily    cefTRIAXone  2 g Intravenous Q24H    clindamycin  900 mg Intravenous Q8H    heparin ANTICOAGULANT  5,000 Units Subcutaneous Q12H    insulin aspart   Device See Admin Instructions    insulin   Subcutaneous TID AC    insulin   Subcutaneous TID AC    melatonin  3  mg Oral At Bedtime    multivitamin, therapeutic  1 tablet Oral Daily    primidone  100 mg Oral At Bedtime    propranolol ER  60 mg Oral Daily    rosuvastatin  10 mg Oral Daily    saccharomyces boulardii  250 mg Oral BID    sodium chloride (PF)  3 mL Intracatheter Q8H       Data   All microbiology laboratory data reviewed.  Recent Labs   Lab Test 03/09/24  1111 03/08/24  0900 03/07/24  0830   WBC 15.3* 15.6* 22.0*   HGB 9.3* 11.0* 10.0*   HCT 27.4* 32.9* 29.4*   MCV 64* 64* 64*    163 168     Recent Labs   Lab Test 03/09/24  1111 03/08/24  0900 03/07/24  0830   CR 1.34* 2.19* 2.58*

## 2024-03-09 NOTE — PLAN OF CARE
"Goal Outcome Evaluation:      Plan of Care Reviewed With: patient        Summary: Left abdominal cellulitis, sepsis, DM, ITALIA, CHF    DATE & TIME: 03/08/2024- 03/09/2024 [id number]    Cognitive Concerns/ Orientation: A&Ox4 - Intermittent confusion   BEHAVIOR & AGGRESSION TOOL COLOR: Green  ABNL VS/O2: VSS on RA. Has own CPAP machine   MOBILITY: Independent in room.   PAIN MANAGMENT: Denied pain this shift   DIET: Mod carb  BOWEL/BLADDER: Continent  ABNL LAB/BG: WBC 15.6, Cr 2.19, .51, Hgb 11.0, , 98, 114  DRAIN/DEVICES: L PIV SL, pt has his own insulin pump & BG checking device  TELEMETRY RHYTHM: NA  SKIN: Abdominal cellulitis outlined improving, left heel wound, dry flaky skin. Insulin pump to abdomen. Patient manages insulin pump.  TESTS/PROCEDURES: NA  D/C DAY/GOALS/PLACE: pending    OTHER IMPORTANT INFO: on IV ceftriaxone and clindamycin. MD said okay to documenting in MAR insulin \"NOT GIVEN\" since patient is oriented and manages it himself. Wife brought refill of insulin pump for patient, patient will refill.         "

## 2024-03-09 NOTE — PLAN OF CARE
Goal Outcome Evaluation:  Summary:  Left abdominal cellulitis, sepsis, DM, ITALIA, CHF  DATE & TIME: 03/09/24, [id number]    Cognitive Concerns/ Orientation : A&Ox4, calm and cooperative.  BEHAVIOR & AGGRESSION TOOL COLOR: Green  CIWA SCORE: NA        ABNL VS/O2: VSS on room air. Has own CPAP machine   MOBILITY: Independent in room.   PAIN MANAGMENT: c/o abdominal pain, declined intervention.  DIET: Mod carb. Good appetite  BOWEL/BLADDER: Continent  ABNL LAB/BG: WBC 15.3, Cr 1.34, Hgb 9.3, /121   DRAIN/DEVICES: Left PIV SL, pt has his own insulin pump & BG checking device  TELEMETRY RHYTHM: NA  SKIN: Abdominal cellulitis outlined improving, left heel wound, dry flaky skin. Insulin pump to abdomen. Patient manages pump.   TESTS/PROCEDURES: NA  D/C DAY/GOALS/PLACE: pending ID recommendations.  OTHER IMPORTANT INFO: on IV ceftriaxone and clindamycin. MD said okay to documenting in MAR insulin NOT GIVEN since patient is oriented and manages it himself. ID following.

## 2024-03-09 NOTE — PROGRESS NOTES
[facility]    Medicine Progress Note - Hospitalist Service    Date of Admission:  3/4/2024    Assessment & Plan     Enrique Long is a 73 year old male PMH followed by [facility]  diabetes type 1 with insulin pump, HFpEF presents to the ED with severe abdominal pain with erythema at insulin pump site onset yesterday      Abdominal wall cellulitis at insulin pump site  Sepsis secondary to above  Enrique Long is a 73 year old male with a past medical history significant for diabetes type 1, CHF who presents to the ED via/accompanied by wife with a chief complaint of severe abdominal pain onset yesterday after switching the site of the OmniPod with associated erythema surrounding site.    In ED, afebrile, WBC 21.4 K, CRP 53.52.  LA 3.4, CR 1.52, normal BUN, random glucose 198, CT abdomen left skin thickening and subcu soft tissue stranding, no abscess or foreign body appreciated.  Soft tissue ultrasound pending.  Started on Zosyn and Vanco.  Given LR 1.5 L bolus.     -Was started on vancomycin and Zosyn, MRSA nasal swab negative, ID consulted, vancomycin was changed to daptomycin initially, subsequently stopped and now continuing with Rocephin and clindamycin given suspected cellulitis due to Streptococcus. Appreciate ID assistance.    - Lactic acid normalized. BC- NGTD, leucocytosis is improving 21.4-->22.6-->25.9-->22-->15.6, CRP 53-->251-->192-->137. Follow  -Abdominal wall ultrasound shows soft tissue swelling but no fluid collection. CT as above  -Optimize glycemic control, continue insulin pump protocol.   -Continue pain management as ordered      Acute kidney injury  Anion gap metabolic acidosis, lactic acidosis  Last CR on record 7/2023 at [facility] 1.0.  Patient presented with a creatinine of 1.52.  Lactic acid 3.4.  -Creatinine trended up 1.5 to--> 2.66--> 2.69-->2.9-->2.58-->2.19-->1.3  -ITALIA is multifactorial :- reports taking Aleve for pain, he is on Lasix,  hydrochlorothiazide, losartan PTA.  Patient also received contrast for CT scan on admission.  Patient has sepsis due to cellulitis.  -Bolus and maintenance IVF ordered and nephrology consulted.  Patient subsequently had dyspnea after IVF bolus and chest x-ray shows mild pulmonary congestion but on room air.  IVF discontinued.   - UA has 19 WBCs, leukocyte esterase, RBCs, hyaline cast and amorphous crystals.  - Holding PTA meds including HCTZ, Lasix, losartan.   - Cr likely peaked at 2.9 and now trending down, as above  - Avoid nephrotoxic medications, continue to hold PTA Lasix, hydrochlorothiazide, losartan.  - Blood pressure was low normal, hold parameters in place for low-dose amlodipine and propranolol    Type 1 diabetes, 68 years on insulin pump  Diabetic neuropathy, lower extremity  Albuminuria  Followed by Dr. Garcia at, endocrinology, [facility].  -Continue insulin pump protocol, basal rate, with carb counting,   Noted insulin pump injection site changed 3/3/2024.  Patient is able to manage his pump on his own.    -Advance diet as tolerated to modified carb diet.  -Monitor glucoses, adjust insulin regimen accordingly.  -Follow-up with Dr. Gracia as outpatient on hospital discharge.     HFpEF  Hypertension.   PTA on ASA 81 mg,  amlodipine, Hyzaar, furosemide 20 mg as needed for LE edema   -Continue PTA amlodipine with hold parameters in place  - Losartan, HCTZ and furosemide as above given ITALIA.  -Follow-up [facility] as outpatient on hospital discharge  -Mildly dyspneic only with activity after IVF.  Chest x-ray shows mild pulmonary congestion.  Patient on room air.   -Lasix PRN. Likely in AM if Cr continues to improve     Obstructive sleep apnea  -Continue CPAP    Chronic anemia  Hb stable at 10-11, and as low as 9.3, suspect underlying CKD.  Denies any history of bleeding including hematochezia or melena     Essential tremor  Patient reports longstanding, on Mysoline and propranolol  "continue    Scrotal swelling  Continuation from abd, likely dependent edema. Non tender.  -Elevate         Diet: Combination Diet Regular Diet Adult; Moderate Consistent Carb (60 g CHO per Meal) Diet    DVT Prophylaxis: Pneumatic Compression Devices/heparin SQ  Valentin Catheter: Not present  Lines: None     Cardiac Monitoring: None  Code Status: Full Code      Clinically Significant Risk Factors                           # Obesity: Estimated body mass index is 30.5 kg/m  as calculated from the following:    Height as of this encounter: 1.651 m (5' 5\").    Weight as of this encounter: 83.1 kg (183 lb 4.8 oz).             Disposition Plan      Expected Discharge Date: 03/10/2024            anticipate 1-2 more days of IV abx. PT eval noted. Home at discharge.         Ortiz Madison MD  Hospitalist Service  [facility]  Securely message with [employer] (more info)  Text page via [facility] Paging/Directory   ______________________________________________________________________    Interval History     Chart reviewed, discussed with nursing staff and patient was seen this morning. No acute issues reported. Patient states he is up and walking but denies dyspnea. Afebrile, Tmax 99.4  - denies abd pain        Physical Exam   Vital Signs: Temp: 98  F (36.7  C) Temp src: Oral BP: (!) 151/61 Pulse: 70   Resp: 18 SpO2: 98 % O2 Device: None (Room air)    Weight: 183 lbs 4.8 oz    General: AAOx3, very pleasant, appears comfortable.  HEENT: PERRLA EOMI. Mucosa moist.   Lungs: Bilateral equal air entry.  Diminished breath sounds, crepitations posteriorly, no wheezing, normal work of breathing. On room air  CVS: S1S2 regular, no tachycardia or murmur.   Abdomen: Soft, distended, indurated tender abdominal wall with erythema, that has now improved. Blistering noted in the erythema.   Insulin pump and Dexcom attached to abdominal wall few inches oval the area of redness/induration  : penile an scrotal edema stable. " Not tender.   MSK: Legs appear puffy, trace edema present.  Neuro: AAOX3. CN 2-12 normal. Strength symmetrical.  Skin: No rash. Erythema in the abdominal wall as above, no open wound or drainage      Medical Decision Making       38 MINUTES SPENT BY ME on the date of service doing chart review, history, exam, documentation & further activities per the note.   Discussed with patient and RN     Data     I have personally reviewed the following data over the past 24 hrs:    15.3 (H)  \   9.3 (L)   / 196     140 105 35.0 (H) /  135 (H)   3.5 26 1.34 (H) \       Imaging results reviewed over the past 24 hrs:   No results found for this or any previous visit (from the past 24 hour(s)).

## 2024-03-10 VITALS
WEIGHT: 179.2 LBS | HEIGHT: 65 IN | SYSTOLIC BLOOD PRESSURE: 157 MMHG | HEART RATE: 61 BPM | TEMPERATURE: 98.4 F | DIASTOLIC BLOOD PRESSURE: 74 MMHG | OXYGEN SATURATION: 100 % | RESPIRATION RATE: 16 BRPM | BODY MASS INDEX: 29.85 KG/M2

## 2024-03-10 LAB
ANION GAP SERPL CALCULATED.3IONS-SCNC: 11 MMOL/L (ref 7–15)
BASOPHILS # BLD AUTO: ABNORMAL 10*3/UL
BASOPHILS # BLD MANUAL: 0 10E3/UL (ref 0–0.2)
BASOPHILS NFR BLD AUTO: ABNORMAL %
BASOPHILS NFR BLD MANUAL: 0 %
BUN SERPL-MCNC: 21.9 MG/DL (ref 8–23)
CALCIUM SERPL-MCNC: 8.3 MG/DL (ref 8.8–10.2)
CHLORIDE SERPL-SCNC: 106 MMOL/L (ref 98–107)
CREAT SERPL-MCNC: 1.11 MG/DL (ref 0.67–1.17)
CRP SERPL-MCNC: 43.49 MG/L
DEPRECATED HCO3 PLAS-SCNC: 23 MMOL/L (ref 22–29)
EGFRCR SERPLBLD CKD-EPI 2021: 69 ML/MIN/1.73M2
ELLIPTOCYTES BLD QL SMEAR: ABNORMAL
EOSINOPHIL # BLD AUTO: ABNORMAL 10*3/UL
EOSINOPHIL # BLD MANUAL: 0.3 10E3/UL (ref 0–0.7)
EOSINOPHIL NFR BLD AUTO: ABNORMAL %
EOSINOPHIL NFR BLD MANUAL: 2 %
ERYTHROCYTE [DISTWIDTH] IN BLOOD BY AUTOMATED COUNT: 15.2 % (ref 10–15)
GLUCOSE BLDC GLUCOMTR-MCNC: 149 MG/DL (ref 70–99)
GLUCOSE BLDC GLUCOMTR-MCNC: 152 MG/DL (ref 70–99)
GLUCOSE BLDC GLUCOMTR-MCNC: 156 MG/DL (ref 70–99)
GLUCOSE SERPL-MCNC: 149 MG/DL (ref 70–99)
HCT VFR BLD AUTO: 29.4 % (ref 40–53)
HGB BLD-MCNC: 10.1 G/DL (ref 13.3–17.7)
IMM GRANULOCYTES # BLD: ABNORMAL 10*3/UL
IMM GRANULOCYTES NFR BLD: ABNORMAL %
LYMPHOCYTES # BLD AUTO: ABNORMAL 10*3/UL
LYMPHOCYTES # BLD MANUAL: 2.8 10E3/UL (ref 0.8–5.3)
LYMPHOCYTES NFR BLD AUTO: ABNORMAL %
LYMPHOCYTES NFR BLD MANUAL: 17 %
MCH RBC QN AUTO: 22 PG (ref 26.5–33)
MCHC RBC AUTO-ENTMCNC: 34.4 G/DL (ref 31.5–36.5)
MCV RBC AUTO: 64 FL (ref 78–100)
METAMYELOCYTES # BLD MANUAL: 1.1 10E3/UL
METAMYELOCYTES NFR BLD MANUAL: 7 %
MONOCYTES # BLD AUTO: ABNORMAL 10*3/UL
MONOCYTES # BLD MANUAL: 0.7 10E3/UL (ref 0–1.3)
MONOCYTES NFR BLD AUTO: ABNORMAL %
MONOCYTES NFR BLD MANUAL: 4 %
MYELOCYTES # BLD MANUAL: 0.2 10E3/UL
MYELOCYTES NFR BLD MANUAL: 1 %
NEUTROPHILS # BLD AUTO: ABNORMAL 10*3/UL
NEUTROPHILS # BLD MANUAL: 11.3 10E3/UL (ref 1.6–8.3)
NEUTROPHILS NFR BLD AUTO: ABNORMAL %
NEUTROPHILS NFR BLD MANUAL: 69 %
NRBC # BLD AUTO: 0 10E3/UL
NRBC BLD AUTO-RTO: 0 /100
PLAT MORPH BLD: ABNORMAL
PLATELET # BLD AUTO: 257 10E3/UL (ref 150–450)
POTASSIUM SERPL-SCNC: 3.4 MMOL/L (ref 3.4–5.3)
RBC # BLD AUTO: 4.59 10E6/UL (ref 4.4–5.9)
RBC MORPH BLD: ABNORMAL
SODIUM SERPL-SCNC: 140 MMOL/L (ref 135–145)
TARGETS BLD QL SMEAR: ABNORMAL
WBC # BLD AUTO: 16.4 10E3/UL (ref 4–11)

## 2024-03-10 PROCEDURE — 99239 HOSP IP/OBS DSCHRG MGMT >30: CPT | Performed by: HOSPITALIST

## 2024-03-10 PROCEDURE — 80048 BASIC METABOLIC PNL TOTAL CA: CPT | Performed by: HOSPITALIST

## 2024-03-10 PROCEDURE — 86140 C-REACTIVE PROTEIN: CPT | Performed by: HOSPITALIST

## 2024-03-10 PROCEDURE — 85027 COMPLETE CBC AUTOMATED: CPT | Performed by: HOSPITALIST

## 2024-03-10 PROCEDURE — 36415 COLL VENOUS BLD VENIPUNCTURE: CPT | Performed by: HOSPITALIST

## 2024-03-10 PROCEDURE — 250N000011 HC RX IP 250 OP 636: Performed by: HOSPITALIST

## 2024-03-10 PROCEDURE — 2894A VOIDCORRECT: CPT | Performed by: HOSPITALIST

## 2024-03-10 PROCEDURE — 250N000013 HC RX MED GY IP 250 OP 250 PS 637: Performed by: HOSPITALIST

## 2024-03-10 PROCEDURE — 250N000011 HC RX IP 250 OP 636: Performed by: INTERNAL MEDICINE

## 2024-03-10 PROCEDURE — 85007 BL SMEAR W/DIFF WBC COUNT: CPT | Performed by: HOSPITALIST

## 2024-03-10 RX ORDER — HYDROCHLOROTHIAZIDE 12.5 MG/1
12.5 TABLET ORAL DAILY
Status: DISCONTINUED | OUTPATIENT
Start: 2024-03-10 | End: 2024-03-10 | Stop reason: HOSPADM

## 2024-03-10 RX ORDER — AMLODIPINE BESYLATE 2.5 MG/1
2.5 TABLET ORAL ONCE
Status: COMPLETED | OUTPATIENT
Start: 2024-03-10 | End: 2024-03-10

## 2024-03-10 RX ORDER — ACETAMINOPHEN 325 MG/1
650 TABLET ORAL EVERY 6 HOURS PRN
COMMUNITY
Start: 2024-03-10

## 2024-03-10 RX ORDER — HYDROCHLOROTHIAZIDE 25 MG/1
25 TABLET ORAL DAILY
Qty: 30 TABLET | Refills: 0 | Status: SHIPPED | OUTPATIENT
Start: 2024-03-10

## 2024-03-10 RX ORDER — SACCHAROMYCES BOULARDII 250 MG
250 CAPSULE ORAL 2 TIMES DAILY
Qty: 20 CAPSULE | Refills: 0 | Status: SHIPPED | OUTPATIENT
Start: 2024-03-10 | End: 2024-03-20

## 2024-03-10 RX ORDER — AMLODIPINE BESYLATE 10 MG/1
10 TABLET ORAL DAILY
Qty: 30 TABLET | Refills: 0 | Status: SHIPPED | OUTPATIENT
Start: 2024-03-10

## 2024-03-10 RX ORDER — AMLODIPINE BESYLATE 10 MG/1
10 TABLET ORAL DAILY
Status: DISCONTINUED | OUTPATIENT
Start: 2024-03-11 | End: 2024-03-10 | Stop reason: HOSPADM

## 2024-03-10 RX ADMIN — MULTIVITAMIN TABLET 1 TABLET: TABLET at 08:36

## 2024-03-10 RX ADMIN — AMLODIPINE BESYLATE 2.5 MG: 2.5 TABLET ORAL at 11:48

## 2024-03-10 RX ADMIN — HYDROCHLOROTHIAZIDE 12.5 MG: 12.5 TABLET ORAL at 13:45

## 2024-03-10 RX ADMIN — CLINDAMYCIN PHOSPHATE 900 MG: 900 INJECTION, SOLUTION INTRAVENOUS at 04:09

## 2024-03-10 RX ADMIN — AMOXICILLIN AND CLAVULANATE POTASSIUM 1 TABLET: 875; 125 TABLET, FILM COATED ORAL at 13:45

## 2024-03-10 RX ADMIN — ROSUVASTATIN CALCIUM 10 MG: 10 TABLET, FILM COATED ORAL at 08:35

## 2024-03-10 RX ADMIN — HEPARIN SODIUM 5000 UNITS: 5000 INJECTION, SOLUTION INTRAVENOUS; SUBCUTANEOUS at 11:53

## 2024-03-10 RX ADMIN — PROPRANOLOL HYDROCHLORIDE 60 MG: 60 CAPSULE, EXTENDED RELEASE ORAL at 08:36

## 2024-03-10 RX ADMIN — Medication 250 MG: at 08:36

## 2024-03-10 RX ADMIN — ASPIRIN 81 MG: 81 TABLET, COATED ORAL at 08:36

## 2024-03-10 RX ADMIN — AMLODIPINE BESYLATE 5 MG: 5 TABLET ORAL at 08:36

## 2024-03-10 ASSESSMENT — ACTIVITIES OF DAILY LIVING (ADL)
ADLS_ACUITY_SCORE: 26

## 2024-03-10 NOTE — PROGRESS NOTES
[facility]    Medicine Progress Note - Hospitalist Service    Date of Admission:  3/4/2024    Assessment & Plan     Enrique Lnog is a 73 year old male PMH followed by [facility]  diabetes type 1 with insulin pump, HFpEF presents to the ED with severe abdominal pain with erythema at insulin pump site onset yesterday      Abdominal wall cellulitis at insulin pump site  Sepsis secondary to above  Enrique Long is a 73 year old male with a past medical history significant for diabetes type 1, CHF who presents to the ED via/accompanied by wife with a chief complaint of severe abdominal pain onset yesterday after switching the site of the OmniPod with associated erythema surrounding site.    In ED, afebrile, WBC 21.4 K, CRP 53.52.  LA 3.4, CR 1.52, normal BUN, random glucose 198, CT abdomen left skin thickening and subcu soft tissue stranding, no abscess or foreign body appreciated.  Soft tissue ultrasound pending.  Started on Zosyn and Vanco.  Given LR 1.5 L bolus.     -Was started on vancomycin and Zosyn, MRSA nasal swab negative, ID consulted, vancomycin was changed to daptomycin initially, subsequently stopped and now continuing with Rocephin and clindamycin given suspected cellulitis due to Streptococcus. Appreciate ID assistance.  - Plan is Augmentin on discharge.      - Lactic acid normalized. BC- NGTD, leucocytosis improved although shightly up 3/10: Trend 21.4-->22.6-->25.9-->22-->15.6--> 16.4  - CRP 53-->251-->192-->137. Follow  -Abdominal wall ultrasound shows soft tissue swelling but no fluid collection. CT as above  -Optimize glycemic control, continue insulin pump protocol.   -Continue pain management as ordered      Acute kidney injury  Anion gap metabolic acidosis, lactic acidosis  Last CR on record 7/2023 at [facility] 1.0.  Patient presented with a creatinine of 1.52.  Lactic acid 3.4.  -Creatinine trended up 1.5 to--> 2.66--> 2.69-->2.9-->2.58-->2.19-->1.3->1.1  -ITALIA is  multifactorial :- reports taking Aleve for pain, he is on Lasix, hydrochlorothiazide, losartan PTA.  Patient also received contrast for CT scan on admission.  Patient has sepsis due to cellulitis.  -Bolus and maintenance IVF ordered and nephrology consulted.  Patient subsequently had dyspnea after IVF bolus and chest x-ray shows mild pulmonary congestion but on room air.  IVF discontinued.   - UA has 19 WBCs, leukocyte esterase, RBCs, hyaline cast and amorphous crystals.  -  PTA meds including HCTZ, Lasix, losartan held when ITALIA noted. Now that Cr improved, and BP elevated,  hydrochlorothiazide resumed at lower dose, and titrate.   - Cr peaked at 2.9 and now trending down as above  - Avoid nephrotoxic medications, continue to hold PTA Lasix and losartan.  - Blood pressure was low normal initially, subsequently elevated. Amlodipine started and increased, hydrochlorothiazide as above     Type 1 diabetes, 68 years on insulin pump  Diabetic neuropathy, lower extremity  Albuminuria  Followed by Dr. Garcia at, endocrinology, [facility].  -Continue insulin pump protocol, basal rate, with carb counting,   Noted insulin pump injection site changed 3/3/2024.  Patient is able to manage his pump on his own.    -Advance diet as tolerated to modified carb diet.  -Monitor glucoses, adjust insulin regimen accordingly.  -Follow-up with Dr. Garcia as outpatient on hospital discharge.     HFpEF  Hypertension.   PTA on ASA 81 mg,  amlodipine, Hyzaar, furosemide 20 mg as needed for LE edema   -Continue PTA amlodipine with hold parameters in place  - Losartan, HCTZ and furosemide as above given ITALIA.  -Follow-up [facility] cardiology/[facility] as outpatient on hospital discharge  -Mildly dyspneic only with activity after IVF.  Chest x-ray shows mild pulmonary congestion.  Patient on room air. Received lasix x1.   -Lasix PRN, If weight is up.     Obstructive sleep apnea  -Continue CPAP    Chronic anemia  Hb stable at 10-11, and as low as 9.3, suspect  "underlying CKD.  Denies any history of bleeding including hematochezia or melena     Essential tremor  Patient reports longstanding, on Mysoline and propranolol continue    Scrotal swelling  Continuation from abd, likely dependent edema. Non tender.  -continue to Elevate, improved.        Diet: Combination Diet Regular Diet Adult; Moderate Consistent Carb (60 g CHO per Meal) Diet    DVT Prophylaxis: Pneumatic Compression Devices/heparin SQ  Valentin Catheter: Not present  Lines: None     Cardiac Monitoring: None  Code Status: Full Code      Clinically Significant Risk Factors                           # Overweight: Estimated body mass index is 29.82 kg/m  as calculated from the following:    Height as of this encounter: 1.651 m (5' 5\").    Weight as of this encounter: 81.3 kg (179 lb 3.2 oz).             Disposition Plan     Expected Discharge Date: 03/10/2024            discharge later today/tomorrow when ok with ID. PT maldonado noted. Home at discharge.         Ortiz Madison MD  Hospitalist Service  [facility]  Securely message with [employer] (more info)  Text page via [employer] Paging/Directory   ______________________________________________________________________    Interval History     Chart reviewed, discussed with nursing staff and patient was seen this morning. No acute issues reported.   Abdominal pain has improved and is not using any pain medication.  Tmax 99.  Creatinine now at baseline  Noted white cell count slightly worsened.        Physical Exam   Vital Signs: Temp: 98.6  F (37  C) Temp src: Oral BP: (!) 181/72 Pulse: 64   Resp: 20 SpO2: 99 % O2 Device: None (Room air)    Weight: 179 lbs 3.2 oz    General: AAOx3, very pleasant, appears comfortable. Sitting up in chair.  HEENT: PERRLA EOMI. Mucosa moist.   Lungs: Bilateral equal air entry.  Diminished breath sounds, fine basal crepitations posteriorly, no wheezing, normal work of breathing. On room air  CVS: S1S2 regular, no " tachycardia or murmur.   Abdomen: Soft, distended, indurated tender abdominal wall with erythema, that has markedly improved specially in the last 2 days although some blistering noted in the erythema.    Insulin pump and Dexcom attached to abdominal wall few inches oval the area of redness/induration  : penile an scrotal edema has improved.   MSK: Legs appear puffy, trace edema present.  Neuro: AAOX3. CN 2-12 normal. Strength symmetrical.  Skin: No rash. Erythema in the abdominal wall as above with some blistering      Medical Decision Making       35 MINUTES SPENT BY ME on the date of service doing chart review, history, exam, documentation & further activities per the note.   Discussed with patient and RN. Discussed with his spouse 3/9, will update when available.      Data     I have personally reviewed the following data over the past 24 hrs:    16.4 (H)  \   10.1 (L)   / 257     140 106 21.9 /  156 (H)   3.4 23 1.11 \       Imaging results reviewed over the past 24 hrs:   No results found for this or any previous visit (from the past 24 hour(s)).

## 2024-03-10 NOTE — DISCHARGE SUMMARY
Discharge    Patient discharged to home via car with wife    Listed belongings gathered and given to patient (including from security/pharmacy). Yes  Care Plan and Patient education resolved: Yes  Prescriptions if needed, hard copies sent with patient  Yes  Medication Bin checked and emptied on discharge Yes  SW/care coordinator/charge RN aware of discharge: Yes     Summary:  Left abdominal cellulitis, sepsis, DM, ITALIA, CHF    DATE & TIME: 03/10/2024 [id number]    Cognitive Concerns/ Orientation: A&Ox4  BEHAVIOR & AGGRESSION TOOL COLOR: Green      ABNL VS/O2: VSS on room air. Has own CPAP machine   MOBILITY: Independent in room.   PAIN MANAGMENT: Denied pain this shift   DIET: Mod carb  BOWEL/BLADDER: Continent  ABNL LAB/BG: WBC 16.4   DRAIN/DEVICES: Left PIV removed  TELEMETRY RHYTHM: NA  SKIN: Abdominal cellulitis outlined improving, left heel wound, dry flaky skin. Insulin pump to abdomen. Patient manages pump.   TESTS/PROCEDURES: NA  D/C DAY/GOALS/PLACE: Discharging home today w/wife.  OTHER IMPORTANT INFO:

## 2024-03-10 NOTE — DISCHARGE SUMMARY
"[facility]  Hospitalist Discharge Summary      Date of Admission:  3/4/2024  Date of Discharge:  3/10/2024  Discharging Provider: Ortiz Madison MD  Discharge Service: Hospitalist Service    Discharge Diagnoses     Please refer to the hospital course    Clinically Significant Risk Factors     # Overweight: Estimated body mass index is 29.82 kg/m  as calculated from the following:    Height as of this encounter: 1.651 m (5' 5\").    Weight as of this encounter: 81.3 kg (179 lb 3.2 oz).       Follow-ups Needed After Discharge   Follow-up Appointments     Follow-up and recommended labs and tests       Follow up with primary care provider, Physician No Ref-Primary, within 7   days to evaluate medication change, for hospital follow- up, and for   medication refill.  The following labs/tests are recommended: BMP in a   week.             Unresulted Labs Ordered in the Past 30 Days of this Admission       No orders found from 2/3/2024 to 3/5/2024.        These results will be followed up by none    Discharge Disposition   Discharged to home  Condition at discharge: Stable    Hospital Course   Enrique Long is a 73 year old male PMH followed by [facility] Team  diabetes type 1 with insulin pump, HFpEF presents to the ED with severe abdominal pain with erythema at insulin pump site onset yesterday      Abdominal wall cellulitis at insulin pump site  Sepsis secondary to above  Enrique Long is a 73 year old male with a past medical history significant for diabetes type 1, CHF who presents to the ED via/accompanied by wife with a chief complaint of severe abdominal pain onset yesterday after switching the site of the OmniPod with associated erythema surrounding site.    In ED, afebrile, WBC 21.4 K, CRP 53.52.  LA 3.4, CR 1.52, normal BUN, random glucose 198, CT abdomen left skin thickening and subcu soft tissue stranding, no abscess or foreign body appreciated.  Soft tissue ultrasound pending.  " Started on Zosyn and Vanco.  Given LR 1.5 L bolus.     -Was started on vancomycin and Zosyn, MRSA nasal swab negative, ID consulted, vancomycin was changed to daptomycin initially, subsequently stopped and now continuing with Rocephin and clindamycin given suspected cellulitis due to Streptococcus. Appreciate ID assistance.  - Augmentin on discharge.      - Lactic acid normalized. BC- NGTD, leucocytosis improved although shightly up 3/10: Trend 21.4-->22.6-->25.9-->22-->15.6--> 16.4  - CRP 53-->251-->192-->137. Follow  -Abdominal wall ultrasound shows soft tissue swelling but no fluid collection. CT as above   -Continue pain management as ordered   -reviewed with ID, 10 days of augmentin at discharge.     Acute kidney injury  Anion gap metabolic acidosis, lactic acidosis  Last CR on record 7/2023 at [facility] 1.0.  Patient presented with a creatinine of 1.52.  Lactic acid 3.4.  -Creatinine trended up 1.5 to--> 2.66--> 2.69-->2.9-->2.58-->2.19-->1.3->1.1  -ITALIA is multifactorial :- reports taking Aleve for pain, he is on Lasix, hydrochlorothiazide, losartan PTA.  Patient also received contrast for CT scan on admission.  Patient has sepsis due to cellulitis.  -Bolus and maintenance IVF ordered and nephrology consulted.  Patient subsequently had dyspnea after IVF bolus and chest x-ray shows mild pulmonary congestion but on room air.  IVF discontinued.   - UA has 19 WBCs, leukocyte esterase, RBCs, hyaline cast and amorphous crystals.  -  PTA meds including HCTZ, Lasix, losartan held when ITALIA noted. Now that Cr improved, and BP elevated,  hydrochlorothiazide resumed at lower dose, and titrate.   - Cr peaked at 2.9 and now trending down as above  - Avoid nephrotoxic medications, continued to hold PTA Lasix and losartan.  - Blood pressure was low normal initially, subsequently elevated. Amlodipine started and increased, hydrochlorothiazide as above     Type 1 diabetes, 68 years on insulin pump  Diabetic neuropathy, lower  extremity  Albuminuria  Followed by Dr. Garcia at, endocrinology, [facility].  -Continue insulin pump protocol, basal rate, with carb counting,   Noted insulin pump injection site changed 3/3/2024.  Patient is able to manage his pump on his own.    -Advance diet as tolerated to modified carb diet.  -Monitor glucoses, adjust insulin regimen accordingly.  -Follow-up with Dr. Garcia as outpatient on hospital discharge.     HFpEF  Hypertension.   PTA on ASA 81 mg,  amlodipine, Hyzaar, furosemide 20 mg as needed for LE edema   -Continue PTA amlodipine with hold parameters in place  - Losartan, HCTZ and furosemide as above given ITALIA.  -Follow-up [facility] as outpatient on hospital discharge  -Mildly dyspneic only with activity after IVF.  Chest x-ray shows mild pulmonary congestion.  Patient on room air. Received lasix x1.   -Lasix PRN, If weight is up.     Obstructive sleep apnea  -Continue CPAP    Chronic anemia  Hb stable at 10-11, and as low as 9.3, suspect underlying CKD.  Denies any history of bleeding including hematochezia or melena     Essential tremor  Patient reports longstanding, on Mysoline and propranolol continue    Scrotal swelling  Continuation from abd, likely dependent edema. Non tender.  -continue to Elevate, improved.    Consultations This Hospital Stay   PHARMACY TO DOSE VANCO  PHARMACY TO DOSE VANCO  INFECTIOUS DISEASES IP CONSULT  NEPHROLOGY IP CONSULT  PHYSICAL THERAPY ADULT IP CONSULT  VASCULAR ACCESS ADULT IP CONSULT    Code Status   Full Code    Time Spent on this Encounter   I, Ortiz Madison MD, personally saw the patient today and spent greater than 30 minutes discharging this patient.       Ortiz Madison MD  [place] [address] [address]  [facility] [place] [address]  Phone: [phone]  ______________________________________________________________________    Physical Exam   Vital Signs: Temp: 98.4  F (36.9  C) Temp src: Oral BP: (!)  157/74 Pulse: 61   Resp: 16 SpO2: 100 % O2 Device: None (Room air)    Weight: 179 lbs 3.2 oz    Please refer to the physical exam from same day progress ntoe        Primary Care Physician   Physician No Ref-Primary    Discharge Orders      Reason for your hospital stay    Abdominal wall cellulitis and acute kidney injury     Follow-up and recommended labs and tests     Follow up with primary care provider, Physician No Ref-Primary, within 7 days to evaluate medication change, for hospital follow- up, and for medication refill.  The following labs/tests are recommended: BMP in a week.     Activity    Your activity upon discharge: activity as tolerated     Diet    Follow this diet upon discharge: Orders Placed This Encounter      Combination Diet Regular Diet Adult; Moderate Consistent Carb (60 g CHO per Meal) Diet       Significant Results and Procedures   Most Recent 3 CBC's:  Recent Labs   Lab Test 03/10/24  0855 03/09/24  1111 03/08/24  0900   WBC 16.4* 15.3* 15.6*   HGB 10.1* 9.3* 11.0*   MCV 64* 64* 64*    196 163     Most Recent 3 BMP's:  Recent Labs   Lab Test 03/10/24  1238 03/10/24  0913 03/10/24  0855 03/09/24  1656 03/09/24  1111 03/08/24  1206 03/08/24  0900   NA  --   --  140  --  140  --  140   POTASSIUM  --   --  3.4  --  3.5  --  3.4   CHLORIDE  --   --  106  --  105  --  103   CO2  --   --  23  --  26  --  22   BUN  --   --  21.9  --  35.0*  --  61.9*   CR  --   --  1.11  --  1.34*  --  2.19*   ANIONGAP  --   --  11  --  9  --  15   PILY  --   --  8.3*  --  8.1*  --  8.7*   * 156* 149*   < > 135*   < > 142*    < > = values in this interval not displayed.     Most Recent 2 LFT's:  Recent Labs   Lab Test 03/04/24  0652   AST 78*   ALT 61   ALKPHOS 93   BILITOTAL 0.6     7-Day Micro Results       Collected Updated Procedure Result Status      03/05/2024 1117 03/06/2024 1040 Urine Culture [[id number]]   Urine, Midstream    Final result Component Value   Culture No Growth                03/04/2024 1455 03/04/2024 1959 MRSA MSSA PCR, Nasal Swab [[id number]]    Swab from Nares, Bilateral    Final result Component Value   MRSA Target DNA Negative   SA Target DNA Positive            03/04/2024 0725 03/09/2024 1131 Blood Culture Peripheral Blood [[id number]]   Peripheral Blood    Final result Component Value   Culture No Growth               03/04/2024 0652 03/09/2024 1332 Blood Culture Peripheral Blood [[id number]]   Peripheral Blood    Final result Component Value   Culture No Growth                     Most Recent TSH and T4:No lab results found.  Most Recent Hemoglobin A1c:  Recent Labs   Lab Test 03/04/24  0652   A1C 6.9*     Most Recent 6 glucoses:  Recent Labs   Lab Test 03/10/24  1238 03/10/24  0913 03/10/24  0855 03/10/24  0137 03/09/24  2123 03/09/24  1656   * 156* 149* 152* 123* 121*   ,   Results for orders placed or performed during the hospital encounter of 03/04/24   CT Abdomen Pelvis w Contrast    Narrative    CT ABDOMEN/PELVIS WITH CONTRAST March 4, 2024 7:53 AM    CLINICAL HISTORY: Left lower quadrant abdominal pain, likely  cellulitis, evaluate abscess or retained foreign body.    TECHNIQUE: CT scan of the abdomen and pelvis was performed following  injection of IV contrast. Multiplanar reformats were obtained. Dose  reduction techniques were used.  CONTRAST: 85 mL Isovue-370.    COMPARISON: None.    FINDINGS:   LOWER CHEST: Tiny hiatal hernia.    HEPATOBILIARY: No significant mass or bile duct dilatation. No  calcified gallstones.     PANCREAS: No significant mass, duct dilatation, or inflammatory  change.    SPLEEN: Normal size.    ADRENAL GLANDS: Unremarkable.    KIDNEYS/BLADDER: No significant mass, stones, or hydronephrosis.    BOWEL: No obstruction or inflammatory change.    PELVIC ORGANS: Unremarkable.    ADDITIONAL FINDINGS: No ascites. No enlarged lymph nodes.  Nonaneurysmal abdominal aorta. Mild atherosclerosis.    MUSCULOSKELETAL: Left abdominal skin thickening  and subcutaneous  stranding. No organized fluid collection or radiopaque foreign body.  No aggressive osseous lesion. Degenerative changes of the lumbar  spine, most significant at L2-L3.      Impression    IMPRESSION: Left abdominal skin thickening and subcutaneous soft  tissue stranding consistent with reported cellulitis. No abscess or  radiopaque foreign body.    COMPA NAVARRETE MD         SYSTEM ID:  [id number]   US Soft Tissue Abdominal Wall or Lower Back    Narrative    US SOFT TISSUE ABDOMINAL WALL OR LOWER BACK  3/4/2024 9:42 AM     HISTORY: Left lower quadrant erythema, evaluate signs of retained  foreign body from previous insulin pump site.    COMPARISON: None.      Impression    IMPRESSION: Transabdominal imaging of the area of tenderness in the  left abdomen demonstrates no fluid or shadowing foreign bodies. Soft  tissue edema evident.      ELVIRA DECKER MD         SYSTEM ID:  [id number]   XR Chest 2 Views    Narrative    XR CHEST 2 VIEWS  3/5/2024 3:18 PM       INDICATION: dyspnea  COMPARISON: None       Impression    IMPRESSION: Mild cardiomegaly with pulmonary vascular congestion.  Small bilateral pleural effusions. No focal infiltrate.    SIDNEY ALVAREZ MD         SYSTEM ID:  [id number]       Discharge Medications   Current Discharge Medication List        START taking these medications    Details   acetaminophen (TYLENOL) 325 MG tablet Take 2 tablets (650 mg) by mouth every 6 hours as needed for mild pain or other (and adjunct with moderate or severe pain or per patient request)    Associated Diagnoses: Pain      amoxicillin-clavulanate (AUGMENTIN) 875-125 MG tablet Take 1 tablet by mouth every 12 hours  Qty: 19 tablet, Refills: 0    Associated Diagnoses: Cellulitis of abdominal wall      hydrochlorothiazide (HYDRODIURIL) 25 MG tablet Take 1 tablet (25 mg) by mouth daily  Qty: 30 tablet, Refills: 0    Comments: Future refills by PCP  Associated Diagnoses: Benign essential hypertension       saccharomyces boulardii (FLORASTOR) 250 MG capsule Take 1 capsule (250 mg) by mouth 2 times daily for 10 days  Qty: 20 capsule, Refills: 0    Associated Diagnoses: Need for prophylactic measure           CONTINUE these medications which have CHANGED    Details   amLODIPine (NORVASC) 10 MG tablet Take 1 tablet (10 mg) by mouth daily  Qty: 30 tablet, Refills: 0    Comments: Future refills by PCP  Associated Diagnoses: Benign essential hypertension           CONTINUE these medications which have NOT CHANGED    Details   aspirin 81 MG EC tablet Take 81 mg by mouth daily      dexamethasone AF (DECADRON) 0.1 MG/ML solution Swish and spit 1.5 mg in mouth 2 times daily as needed (Mouth sores)  Refills: 6      furosemide (LASIX) 20 MG tablet Take 20 mg by mouth daily as needed      glucagon (GLUCAGEN HYPOKIT) 1 MG SOLR injection Inject 1 mg subcutaneously as needed. LW Addl Instr:Indicated for: Hypoglycemia      Glucosamine Sulfate 500 MG TABS Take 1 tablet by mouth daily      HEMP OIL OR EXTRACT OR OTHER CBD CANNABINOID, NOT MEDICAL CANNABIS,       insulin aspart (FIASP FLEXTOUCH) 100 UNIT/ML pen-injector Inject 50 Units Subcutaneous See Admin Instructions To be injected per insulin pump      insulin aspart (NOVOLOG PEN) 100 UNIT/ML pen Inject 10-15 Units Subcutaneous 3 times daily as needed for high blood sugar To be used if insulin pump can't be utilized      Insulin Disposable Pump (OMNIPOD 5 G6 INTRO, GEN 5,) KIT       insulin glargine (BASAGLAR KWIKPEN) 100 UNIT/ML pen Inject 30 Units Subcutaneous daily as needed for other (Pump issues) To be used if unable to utilize insulin pump  Refills: 0    Comments: <!--EPICS-->If Basaglar is not covered by insurance, may substitute Lantus at same dose and frequency.  <!--EPICE-->      INSULIN PUMP - OUTPATIENT Inject 0.6 Units/hr Subcutaneous continuous Date Last Updated: 3/4/24  Omnipod  BASAL RATES and times:  12  AM (midnight) - 12 AM (Midnight): 0.6 units/hour    CARB  RATIO and times:  1 unit per 15 grams of carbohydrates  Corection Factor (Sensitivity) and times:  1:32  BLOOD GLUCOSE TARGET:  120 mg/dL  Active Insulin Time: 3 hours  Sensor: Yes (Dexcom G6)      loratadine (CLARITIN) 10 MG tablet Take 10 mg by mouth daily as needed      methylcellulose (CITRUCEL) 500 MG TABS tablet Take 1 capsule by mouth 2 times daily as needed      multivitamin, therapeutic (THERA-VIT) TABS tablet Take 1 tablet by mouth daily      nitroGLYcerin (NITROSTAT) 0.4 MG sublingual tablet Place 0.4 mg under the tongue every 5 minutes as needed      Omega 3-6-9 Fatty Acids (RA OMEGA 3-6-9) CAPS Take 1 capsule by mouth daily      primidone (MYSOLINE) 50 MG tablet TAKE 2 TABLETS BY MOUTH TWICE DAILY, SEE SCHEDULE FROM CLINIC  Qty: 120 tablet, Refills: 11    Comments: Hold until patient calls for refill  Associated Diagnoses: Essential tremor      propranolol ER (INDERAL LA) 60 MG 24 hr capsule Take 60 mg by mouth daily      rosuvastatin (CRESTOR) 10 MG tablet Take 10 mg by mouth daily      sildenafil (REVATIO) 20 MG tablet Take 100 mg by mouth daily as needed (erectile dysfunction)           STOP taking these medications       losartan-hydrochlorothiazide (HYZAAR) 100-25 MG tablet Comments:   Reason for Stopping:         naproxen sodium (ALEVE) 220 MG tablet Comments:   Reason for Stopping:             Allergies   Allergies   Allergen Reactions    Chocolate Flavor      Mouth pain    Codeine GI Disturbance and Other (See Comments)     PN: LW Reaction: gi upset      Lisinopril Cough

## 2024-03-10 NOTE — PLAN OF CARE
Goal Outcome Evaluation:      Plan of Care Reviewed With: patient    Overall Patient Progress: improvingOverall Patient Progress: improving         Summary:  Left abdominal cellulitis, sepsis, DM, ITALIA, CHF    DATE & TIME: 03/09/2024 -03/10/2024 [id number]    Cognitive Concerns/ Orientation: A&Ox4  BEHAVIOR & AGGRESSION TOOL COLOR: Green      ABNL VS/O2: VSS on room air. Has own CPAP machine   MOBILITY: Independent in room.   PAIN MANAGMENT: Denied pain this shift   DIET: Mod carb  BOWEL/BLADDER: Continent  ABNL LAB/BG: WBC 15.3, Cr 1.34, Hgb 9.3  , 152  DRAIN/DEVICES: Left PIV SL, pt has his own insulin pump & BG checking device  TELEMETRY RHYTHM: NA  SKIN: Abdominal cellulitis outlined improving, left heel wound, dry flaky skin. Insulin pump to abdomen. Patient manages pump.   TESTS/PROCEDURES: NA  D/C DAY/GOALS/PLACE: pending ID recommendations.    OTHER IMPORTANT INFO: On IV ceftriaxone and clindamycin. MD said okay to documenting in MAR insulin NOT GIVEN since patient is oriented and manages it himself. ID following.

## 2024-03-10 NOTE — PROVIDER NOTIFICATION
MD Notification    Notified Person: MD    Notified Person Name: Dr. Madison    Notification Date/Time: 3/10,24, 1010    Notification Interaction: anita     Purpose of Notification: Hi, 636's Labs are back. Looks like WBC's went up to 16.4  Orders Received: wait for ID to see Patient    Comments:

## 2024-03-12 ENCOUNTER — PATIENT OUTREACH (OUTPATIENT)
Dept: CARE COORDINATION | Facility: CLINIC | Age: 74
End: 2024-03-12
Payer: MEDICARE

## 2024-03-12 NOTE — PROGRESS NOTES
[facility]:   [facility] Contact  [facility]/Voicemail     Clinical Data: Post-Discharge Outreach     Outreach attempted x 2.  Left message on patient's voicemail, providing [facility]'s central phone number of [phone] ([phone]) for questions/concerns and/or to schedule an appt with an [facility] provider, if they do not have a PCP.      Plan:  [facility] will do no further outreaches at this time.       Megan Grady  Community Health Worker  [facility], [facility]  [phone]      *Connected Care Resource Team does NOT follow patient ongoing. Referrals are identified based on internal discharge reports and the outreach is to ensure patient has an understanding of their discharge instructions.

## 2024-06-01 ENCOUNTER — HEALTH MAINTENANCE LETTER (OUTPATIENT)
Age: 74
End: 2024-06-01

## 2025-06-14 ENCOUNTER — HEALTH MAINTENANCE LETTER (OUTPATIENT)
Age: 75
End: 2025-06-14